# Patient Record
Sex: FEMALE | Race: BLACK OR AFRICAN AMERICAN | NOT HISPANIC OR LATINO | Employment: UNEMPLOYED | URBAN - METROPOLITAN AREA
[De-identification: names, ages, dates, MRNs, and addresses within clinical notes are randomized per-mention and may not be internally consistent; named-entity substitution may affect disease eponyms.]

---

## 2019-11-24 ENCOUNTER — APPOINTMENT (OUTPATIENT)
Dept: CT IMAGING | Facility: CLINIC | Age: 59
End: 2019-11-24
Attending: INTERNAL MEDICINE
Payer: COMMERCIAL

## 2019-11-24 ENCOUNTER — APPOINTMENT (OUTPATIENT)
Dept: GENERAL RADIOLOGY | Facility: CLINIC | Age: 59
End: 2019-11-24
Attending: INTERNAL MEDICINE
Payer: COMMERCIAL

## 2019-11-24 ENCOUNTER — HOSPITAL ENCOUNTER (EMERGENCY)
Facility: CLINIC | Age: 59
Discharge: HOME | End: 2019-11-24
Attending: INTERNAL MEDICINE | Admitting: INTERNAL MEDICINE
Payer: COMMERCIAL

## 2019-11-24 VITALS
DIASTOLIC BLOOD PRESSURE: 71 MMHG | RESPIRATION RATE: 19 BRPM | SYSTOLIC BLOOD PRESSURE: 126 MMHG | TEMPERATURE: 99 F | OXYGEN SATURATION: 99 %

## 2019-11-24 DIAGNOSIS — N10 ACUTE PYELONEPHRITIS: ICD-10-CM

## 2019-11-24 LAB
ABO + RH BLD: NORMAL
ABO + RH BLD: NORMAL
ALBUMIN SERPL-MCNC: 3.3 G/DL (ref 3.4–5)
ALBUMIN UR-MCNC: 30 MG/DL
ALP SERPL-CCNC: 101 U/L (ref 40–150)
ALT SERPL W P-5'-P-CCNC: 34 U/L (ref 0–50)
ANION GAP SERPL CALCULATED.3IONS-SCNC: 8 MMOL/L (ref 3–14)
APPEARANCE UR: ABNORMAL
AST SERPL W P-5'-P-CCNC: 21 U/L (ref 0–45)
BACTERIA #/AREA URNS HPF: ABNORMAL /HPF
BASOPHILS # BLD AUTO: 0 10E9/L (ref 0–0.2)
BASOPHILS NFR BLD AUTO: 0.2 %
BILIRUB SERPL-MCNC: 1 MG/DL (ref 0.2–1.3)
BILIRUB UR QL STRIP: NEGATIVE
BLD GP AB SCN SERPL QL: NORMAL
BLOOD BANK CMNT PATIENT-IMP: NORMAL
BUN SERPL-MCNC: 13 MG/DL (ref 7–30)
CALCIUM SERPL-MCNC: 9.5 MG/DL (ref 8.5–10.1)
CHLORIDE SERPL-SCNC: 104 MMOL/L (ref 94–109)
CO2 SERPL-SCNC: 24 MMOL/L (ref 20–32)
COLOR UR AUTO: YELLOW
CREAT SERPL-MCNC: 0.92 MG/DL (ref 0.52–1.04)
CRP SERPL-MCNC: 188 MG/L (ref 0–8)
DIFFERENTIAL METHOD BLD: NORMAL
EOSINOPHIL # BLD AUTO: 0.1 10E9/L (ref 0–0.7)
EOSINOPHIL NFR BLD AUTO: 0.7 %
ERYTHROCYTE [DISTWIDTH] IN BLOOD BY AUTOMATED COUNT: 12.4 % (ref 10–15)
ERYTHROCYTE [SEDIMENTATION RATE] IN BLOOD BY WESTERGREN METHOD: 61 MM/H (ref 0–30)
GFR SERPL CREATININE-BSD FRML MDRD: 67 ML/MIN/{1.73_M2}
GLUCOSE SERPL-MCNC: 159 MG/DL (ref 70–99)
GLUCOSE UR STRIP-MCNC: NEGATIVE MG/DL
HCT VFR BLD AUTO: 36.6 % (ref 35–47)
HGB BLD-MCNC: 12.2 G/DL (ref 11.7–15.7)
HGB UR QL STRIP: ABNORMAL
IMM GRANULOCYTES # BLD: 0 10E9/L (ref 0–0.4)
IMM GRANULOCYTES NFR BLD: 0.4 %
INR PPP: 1.2 (ref 0.86–1.14)
INTERPRETATION ECG - MUSE: NORMAL
KETONES UR STRIP-MCNC: NEGATIVE MG/DL
LACTATE BLD-SCNC: 1.1 MMOL/L (ref 0.7–2)
LEUKOCYTE ESTERASE UR QL STRIP: ABNORMAL
LYMPHOCYTES # BLD AUTO: 1.5 10E9/L (ref 0.8–5.3)
LYMPHOCYTES NFR BLD AUTO: 15.9 %
MCH RBC QN AUTO: 29.8 PG (ref 26.5–33)
MCHC RBC AUTO-ENTMCNC: 33.3 G/DL (ref 31.5–36.5)
MCV RBC AUTO: 90 FL (ref 78–100)
MONOCYTES # BLD AUTO: 0.7 10E9/L (ref 0–1.3)
MONOCYTES NFR BLD AUTO: 7.5 %
MUCOUS THREADS #/AREA URNS LPF: PRESENT /LPF
NEUTROPHILS # BLD AUTO: 7.2 10E9/L (ref 1.6–8.3)
NEUTROPHILS NFR BLD AUTO: 75.3 %
NITRATE UR QL: NEGATIVE
NRBC # BLD AUTO: 0 10*3/UL
NRBC BLD AUTO-RTO: 0 /100
PH UR STRIP: 6 PH (ref 5–7)
PLATELET # BLD AUTO: 172 10E9/L (ref 150–450)
POTASSIUM SERPL-SCNC: 3.4 MMOL/L (ref 3.4–5.3)
PROT SERPL-MCNC: 8.2 G/DL (ref 6.8–8.8)
RBC # BLD AUTO: 4.09 10E12/L (ref 3.8–5.2)
RBC #/AREA URNS AUTO: 2 /HPF (ref 0–2)
SODIUM SERPL-SCNC: 136 MMOL/L (ref 133–144)
SOURCE: ABNORMAL
SP GR UR STRIP: 1.01 (ref 1–1.03)
SPECIMEN EXP DATE BLD: NORMAL
SQUAMOUS #/AREA URNS AUTO: 1 /HPF (ref 0–1)
TROPONIN I SERPL-MCNC: <0.015 UG/L (ref 0–0.04)
UROBILINOGEN UR STRIP-MCNC: NORMAL MG/DL (ref 0–2)
WBC # BLD AUTO: 9.6 10E9/L (ref 4–11)
WBC #/AREA URNS AUTO: 72 /HPF (ref 0–5)
WBC CLUMPS #/AREA URNS HPF: PRESENT /HPF

## 2019-11-24 PROCEDURE — 25000125 ZZHC RX 250: Performed by: INTERNAL MEDICINE

## 2019-11-24 PROCEDURE — 85025 COMPLETE CBC W/AUTO DIFF WBC: CPT | Performed by: INTERNAL MEDICINE

## 2019-11-24 PROCEDURE — 86900 BLOOD TYPING SEROLOGIC ABO: CPT | Performed by: INTERNAL MEDICINE

## 2019-11-24 PROCEDURE — 25000128 H RX IP 250 OP 636: Performed by: INTERNAL MEDICINE

## 2019-11-24 PROCEDURE — 96361 HYDRATE IV INFUSION ADD-ON: CPT | Performed by: INTERNAL MEDICINE

## 2019-11-24 PROCEDURE — 85610 PROTHROMBIN TIME: CPT | Performed by: INTERNAL MEDICINE

## 2019-11-24 PROCEDURE — 80053 COMPREHEN METABOLIC PANEL: CPT | Performed by: INTERNAL MEDICINE

## 2019-11-24 PROCEDURE — 83605 ASSAY OF LACTIC ACID: CPT | Performed by: INTERNAL MEDICINE

## 2019-11-24 PROCEDURE — 86901 BLOOD TYPING SEROLOGIC RH(D): CPT | Performed by: INTERNAL MEDICINE

## 2019-11-24 PROCEDURE — 93010 ELECTROCARDIOGRAM REPORT: CPT | Mod: Z6 | Performed by: INTERNAL MEDICINE

## 2019-11-24 PROCEDURE — 87186 SC STD MICRODIL/AGAR DIL: CPT | Performed by: INTERNAL MEDICINE

## 2019-11-24 PROCEDURE — 81001 URINALYSIS AUTO W/SCOPE: CPT | Performed by: INTERNAL MEDICINE

## 2019-11-24 PROCEDURE — 87086 URINE CULTURE/COLONY COUNT: CPT | Performed by: INTERNAL MEDICINE

## 2019-11-24 PROCEDURE — 74177 CT ABD & PELVIS W/CONTRAST: CPT

## 2019-11-24 PROCEDURE — 85652 RBC SED RATE AUTOMATED: CPT | Performed by: INTERNAL MEDICINE

## 2019-11-24 PROCEDURE — 86850 RBC ANTIBODY SCREEN: CPT | Performed by: INTERNAL MEDICINE

## 2019-11-24 PROCEDURE — 86140 C-REACTIVE PROTEIN: CPT | Performed by: INTERNAL MEDICINE

## 2019-11-24 PROCEDURE — 96375 TX/PRO/DX INJ NEW DRUG ADDON: CPT | Performed by: INTERNAL MEDICINE

## 2019-11-24 PROCEDURE — 84484 ASSAY OF TROPONIN QUANT: CPT | Performed by: INTERNAL MEDICINE

## 2019-11-24 PROCEDURE — 99285 EMERGENCY DEPT VISIT HI MDM: CPT | Mod: 25 | Performed by: INTERNAL MEDICINE

## 2019-11-24 PROCEDURE — 25800030 ZZH RX IP 258 OP 636: Performed by: INTERNAL MEDICINE

## 2019-11-24 PROCEDURE — 87088 URINE BACTERIA CULTURE: CPT | Performed by: INTERNAL MEDICINE

## 2019-11-24 PROCEDURE — 93005 ELECTROCARDIOGRAM TRACING: CPT | Performed by: INTERNAL MEDICINE

## 2019-11-24 PROCEDURE — 96365 THER/PROPH/DIAG IV INF INIT: CPT | Performed by: INTERNAL MEDICINE

## 2019-11-24 PROCEDURE — 71045 X-RAY EXAM CHEST 1 VIEW: CPT

## 2019-11-24 RX ORDER — IOPAMIDOL 755 MG/ML
100 INJECTION, SOLUTION INTRAVASCULAR ONCE
Status: COMPLETED | OUTPATIENT
Start: 2019-11-24 | End: 2019-11-24

## 2019-11-24 RX ORDER — KETOROLAC TROMETHAMINE 30 MG/ML
30 INJECTION, SOLUTION INTRAMUSCULAR; INTRAVENOUS ONCE
Status: COMPLETED | OUTPATIENT
Start: 2019-11-24 | End: 2019-11-24

## 2019-11-24 RX ORDER — METOCLOPRAMIDE HYDROCHLORIDE 5 MG/ML
5 INJECTION INTRAMUSCULAR; INTRAVENOUS ONCE
Status: COMPLETED | OUTPATIENT
Start: 2019-11-24 | End: 2019-11-24

## 2019-11-24 RX ORDER — CEFTRIAXONE 1 G/1
1 INJECTION, POWDER, FOR SOLUTION INTRAMUSCULAR; INTRAVENOUS ONCE
Status: COMPLETED | OUTPATIENT
Start: 2019-11-24 | End: 2019-11-24

## 2019-11-24 RX ORDER — SODIUM CHLORIDE 9 MG/ML
1000 INJECTION, SOLUTION INTRAVENOUS CONTINUOUS
Status: DISCONTINUED | OUTPATIENT
Start: 2019-11-24 | End: 2019-11-24 | Stop reason: HOSPADM

## 2019-11-24 RX ORDER — LEVOFLOXACIN 500 MG/1
500 TABLET, FILM COATED ORAL DAILY
Qty: 7 TABLET | Refills: 0 | Status: SHIPPED | OUTPATIENT
Start: 2019-11-24 | End: 2019-11-28 | Stop reason: ALTCHOICE

## 2019-11-24 RX ORDER — METOCLOPRAMIDE 5 MG/1
5 TABLET ORAL 4 TIMES DAILY PRN
Qty: 10 TABLET | Refills: 0 | Status: SHIPPED | OUTPATIENT
Start: 2019-11-24

## 2019-11-24 RX ADMIN — SODIUM CHLORIDE 1000 ML: 9 INJECTION, SOLUTION INTRAVENOUS at 08:19

## 2019-11-24 RX ADMIN — SODIUM CHLORIDE 65 ML: 9 INJECTION, SOLUTION INTRAVENOUS at 09:36

## 2019-11-24 RX ADMIN — SODIUM CHLORIDE 1000 ML: 9 INJECTION, SOLUTION INTRAVENOUS at 10:17

## 2019-11-24 RX ADMIN — METOCLOPRAMIDE 5 MG: 5 INJECTION, SOLUTION INTRAMUSCULAR; INTRAVENOUS at 08:20

## 2019-11-24 RX ADMIN — IOPAMIDOL 100 ML: 755 INJECTION, SOLUTION INTRAVENOUS at 09:36

## 2019-11-24 RX ADMIN — CEFTRIAXONE SODIUM 1 G: 1 INJECTION, POWDER, FOR SOLUTION INTRAMUSCULAR; INTRAVENOUS at 10:29

## 2019-11-24 RX ADMIN — KETOROLAC TROMETHAMINE 30 MG: 30 INJECTION, SOLUTION INTRAMUSCULAR; INTRAVENOUS at 08:20

## 2019-11-24 ASSESSMENT — ENCOUNTER SYMPTOMS
MYALGIAS: 1
WHEEZING: 0
NAUSEA: 1
DYSURIA: 1
FATIGUE: 1
COUGH: 0
SHORTNESS OF BREATH: 0
ABDOMINAL PAIN: 1
LIGHT-HEADEDNESS: 1
VOMITING: 1
DIARRHEA: 0
FEVER: 1

## 2019-11-24 NOTE — DISCHARGE INSTRUCTIONS
"  Kidney Infection (Adult Female)    An infection in one or both kidneys is called \"pyelonephritis.\" It usually happens when bacteria (or rarely, viruses, fungi, or other disease-causing organisms) get into the kidney. The bacteria (or other disease-causing organisms) can enter the kidneys from the bladder or blood traveling from other parts of the body. A kidney infection can become serious. It can cause severe illness, scarring of the kidneys, or kidney failure if not treated properly.  Common causes for this problem include:    Not keeping the genital area clean and dry, which promotes the growth of bacteria    Wiping back to front which drags bacteria from the rectum toward the urinary opening (urethra)    Wearing tight pants or underwear (this lets moisture build up in the genital area, which helps bacteria grow)    Holding urine in for long periods of time    Dehydration  Kidney infections can cause symptoms similar to a bladder infection. Symptoms include:    Pain (or burning) when urinating    Having to urinate more often than usual    Blood in the urine (pink or red)    Abdominal pain or discomfort, usually in the lower abdomen    Pain in the side or back    Pain above the pubic bone    Fever or chills    Vomiting    Loss of appetite  Treatment is oral antibiotics, or in more severe cases, intramuscular or IV antibiotics. These are started right away and may be changed once urine culture results determine the infecting organisms. Treatment helps prevent a more serious kidney infection.  Medicines  Medicines can help in the treatment of a bladder infection:    Take antibiotics until they are used up, even if you feel better. It is important to finish them to make sure the infection is gone.    Unless another medicine was prescribed, you can use over-the-counter medicines for pain, fever, or discomfort. If you have chronic liver of kidney disease, talk with your healthcare provider before using these " medicines. Also talk with your provider if you've ever had a stomach ulcer or gastrointestinal (GI) bleeding, or are taking blood thinners.  Home care  The following are general care guidelines:    Stay home from work or school. Rest in bed until your fever breaks and you are feeling better, or as advised by your healthcare provider.    Drink lots of fluid unless you must restrict fluids for other medical reasons. This will force the medicine into your urinary system and flush the bacteria out of your body. Ask your healthcare provider how much you should drink.    Don't have sex until you have finished all of your medicine and your symptoms are gone.    Don't have caffeine, alcohol, or spicy foods. These foods may irritate the kidneys and bladder.    Don't take bubble baths. Sensitivity to the chemicals in bubble baths can irritate the urethra.    Make sure you wipe from front to back after using the toilet.    Wear loose cloths and cotton underwear.  Prevention  These self-care steps can help prevent future infections:    Drink plenty of fluids to prevent dehydration and flush out the bladder. Do this unless you must restrict fluids for other health reasons, or your healthcare provider told you not to.    Proper cleaning after going to the bathroom in important. Make sure you wipe from front to back after using the toilet.    Urinate more often. Don't try to hold urine in for a long time.    Don't wear tight-fitting pants and underwear.    Improve your diet to prevent constipation. Eat more fruits, vegetables, and fiber. Eat less junk and fatty foods. Constipation can make a urinary tract infection more likely. Talk with your healthcare provider if you have trouble with bowel movements.    Urinate right after intercourse to flush out the bladder.  Follow-up care  Follow up with your healthcare provider, or as advised. Additional testing may be needed to make sure the infection has cleared. Close follow-up and  further testing is very important to find the cause and to prevent future infections.  If a urine culture was done, you will be contacted if your treatment needs to be changed. If directed, you may call to find out the results.  If you had an X-ray, CT scan, or other diagnostic test, you will be notified of any new findings that may affect your care.  Call 911  Call 911 if any of the following occur:    Trouble breathing    Fainting or loss of consciousness    Rapid or very slow heart rate    Weakness, dizziness, or fainting    Difficulty arousing or confusion  When to seek medical advice  Call your healthcare provider right away if any of these occur:    Fever 100.4 F (38 C) or higher, or as directed by your healthcare provider    Not feeling better within 1 to 2 days after starting antibiotics    Any symptom that continues after 3 days of treatment    Increasing pain in the stomach, back, side, or groin area    Repeated vomiting    Not able to take prescribed medicine due to nausea or another reason    Bloody, dark-colored, or foul smelling urine    Trouble urinating or decreased urine output    No urine for 8 hours, no tears when crying, sunken eyes, or dry mouth  Date Last Reviewed: 10/1/2016    8005-2446 [employer]. [address] [place] [address]. All rights reserved. This information is not intended as a substitute for professional medical care. Always follow your healthcare professional's instructions.      Please make an appointment to follow up with [facility] (phone: [phone] or [facility] (phone: [phone]) or [facility] (phone: [phone]) in 3-7 days even if entirely better.

## 2019-11-24 NOTE — ED AVS SNAPSHOT
[facility], Emergency Department  [address]  [facility] [[email or web address] [address]  Phone:  [phone]  Fax:  [phone]                                    Amber Valencia   MRN: [id number]    Department:  [facility], Emergency Department   Date of Visit:  11/24/2019           After Visit Summary Signature Page    I have received my discharge instructions, and my questions have been answered. I have discussed any challenges I see with this plan with the nurse or doctor.    ..........................................................................................................................................  Patient/Patient Representative Signature      ..........................................................................................................................................  Patient Representative Print Name and Relationship to Patient    ..................................................               ................................................  Date                                   Time    ..........................................................................................................................................  Reviewed by Signature/Title    ...................................................              ..............................................  Date                                               Time          22EPIC Rev 08/18

## 2019-11-24 NOTE — ED PROVIDER NOTES
[facility] EMERGENCY DEPARTMENT ([facility])    11/24/19       History     Chief Complaint   Patient presents with     Dizziness     started friday: flu like symptoms: side/back/neck/shoulder pain; nausea     HPI  Amber KANU Valencia is a 59 year old female with a history of a borderline hypertension, not on medications, who presents with fatigue, nausea, vomiting and fevers since Thursday.  No diarrhea, but noted to have right-sided abdominal/flank pain as well.  She also reports diffuse body aches as well.  No URI symptoms and she denies any chest pain or shortness of breath.  She was apparently praying this morning and almost fainted apparently, family decided to bring her into the Emergency Department.  She also complains of dysuria.    This part of the medical record was transcribed by Dominick Christine, Medical Scribe, from a dictation done by Jose Guadalupe Connolly MD.       I have reviewed the Medications, Allergies, Past Medical and Surgical History, and Social History in the Epic system.    No past medical history on file.    No past surgical history on file.    No family history on file.    Social History     Tobacco Use     Smoking status: Not on file   Substance Use Topics     Alcohol use: Not on file       No current facility-administered medications for this encounter.      Current Outpatient Medications   Medication     levofloxacin (LEVAQUIN) 500 MG tablet     metoclopramide (REGLAN) 5 MG tablet      No Known Allergies      Review of Systems   Constitutional: Positive for fatigue and fever.   HENT: Negative for congestion.    Respiratory: Negative for cough, shortness of breath and wheezing.    Cardiovascular: Negative for chest pain.   Gastrointestinal: Positive for abdominal pain (right sided), nausea and vomiting. Negative for diarrhea.   Genitourinary: Positive for dysuria.   Musculoskeletal: Positive for myalgias.   Neurological: Positive for light-headedness.   All other systems reviewed and are  negative.      Physical Exam   BP: (!) 148/71  Heart Rate: 87  Temp: 99  F (37.2  C)  SpO2: 97 %      Physical Exam  Constitutional:       General: She is not in acute distress.     Appearance: She is not diaphoretic.   HENT:      Head: Atraumatic.      Mouth/Throat:      Pharynx: No oropharyngeal exudate.   Eyes:      General: No scleral icterus.     Pupils: Pupils are equal, round, and reactive to light.   Neck:      Musculoskeletal: Neck supple.   Cardiovascular:      Rate and Rhythm: Normal rate and regular rhythm.      Heart sounds: Normal heart sounds. No murmur. No friction rub. No gallop.    Pulmonary:      Effort: Pulmonary effort is normal. No respiratory distress.      Breath sounds: No stridor. No wheezing, rhonchi or rales.   Chest:      Chest wall: No tenderness.   Abdominal:      General: Abdomen is flat. Bowel sounds are normal. There is no distension.      Palpations: Abdomen is soft.      Tenderness: There is no abdominal tenderness. There is right CVA tenderness. There is no left CVA tenderness, guarding or rebound. Negative signs include Arguello's sign, Rovsing's sign, McBurney's sign, psoas sign and obturator sign.      Hernia: No hernia is present.   Musculoskeletal:         General: No tenderness.   Skin:     General: Skin is warm.      Findings: No rash.         ED Course        Procedures  Results for orders placed during the hospital encounter of 11/24/19   POC US RETROPERITONEAL LIMITED    Impression Limited Bedside Renal Ultrasound, performed and interpreted by me.    Indication: Flank pain  Images of the the right and left kidney were acquired in short and long axis, evaluating for hydronephrosis. A short and long axis of the bladder was evaluated for bladder stones. Image quality was satisfactory..     Findings:  No evidence of hydronephrosis in bilateral kidneys.    No urinary bladder stones seen.         IMPRESSION: No evidence of hydronephrosis or stones.               EKG  Interpretation:      Interpreted by Jose Guadalupe Connolly MD  Time reviewed: 8:12  Symptoms at time of EKG: Dizziness   Rhythm: normal sinus   Rate: 89 bpm  Axis: normal  Ectopy: none  Conduction: normal  ST Segments/ T Waves: No ST-T wave changes  Q Waves: none  Comparison to prior: No old EKG available    Clinical Impression: normal EKG      Results for orders placed or performed during the hospital encounter of 11/24/19 (from the past 24 hour(s))   CBC with platelets differential     Status: None    Collection Time: 11/24/19  8:08 AM   Result Value Ref Range    WBC 9.6 4.0 - 11.0 10e9/L    RBC Count 4.09 3.8 - 5.2 10e12/L    Hemoglobin 12.2 11.7 - 15.7 g/dL    Hematocrit 36.6 35.0 - 47.0 %    MCV 90 78 - 100 fl    MCH 29.8 26.5 - 33.0 pg    MCHC 33.3 31.5 - 36.5 g/dL    RDW 12.4 10.0 - 15.0 %    Platelet Count 172 150 - 450 10e9/L    Diff Method Automated Method     % Neutrophils 75.3 %    % Lymphocytes 15.9 %    % Monocytes 7.5 %    % Eosinophils 0.7 %    % Basophils 0.2 %    % Immature Granulocytes 0.4 %    Nucleated RBCs 0 0 /100    Absolute Neutrophil 7.2 1.6 - 8.3 10e9/L    Absolute Lymphocytes 1.5 0.8 - 5.3 10e9/L    Absolute Monocytes 0.7 0.0 - 1.3 10e9/L    Absolute Eosinophils 0.1 0.0 - 0.7 10e9/L    Absolute Basophils 0.0 0.0 - 0.2 10e9/L    Abs Immature Granulocytes 0.0 0 - 0.4 10e9/L    Absolute Nucleated RBC 0.0    ABO/Rh type and screen     Status: None    Collection Time: 11/24/19  8:08 AM   Result Value Ref Range    ABO O     RH(D) Pos     Antibody Screen Neg     Test Valid Only At          [facility]    Specimen Expires 11/27/2019    INR     Status: Abnormal    Collection Time: 11/24/19  8:08 AM   Result Value Ref Range    INR 1.20 (H) 0.86 - 1.14   Comprehensive metabolic panel     Status: Abnormal    Collection Time: 11/24/19  8:08 AM   Result Value Ref Range    Sodium 136 133 - 144 mmol/L    Potassium 3.4 3.4 - 5.3 mmol/L    Chloride 104 94 - 109 mmol/L     Carbon Dioxide 24 20 - 32 mmol/L    Anion Gap 8 3 - 14 mmol/L    Glucose 159 (H) 70 - 99 mg/dL    Urea Nitrogen 13 7 - 30 mg/dL    Creatinine 0.92 0.52 - 1.04 mg/dL    GFR Estimate 67 >60 mL/min/[1.73_m2]    GFR Estimate If Black 78 >60 mL/min/[1.73_m2]    Calcium 9.5 8.5 - 10.1 mg/dL    Bilirubin Total 1.0 0.2 - 1.3 mg/dL    Albumin 3.3 (L) 3.4 - 5.0 g/dL    Protein Total 8.2 6.8 - 8.8 g/dL    Alkaline Phosphatase 101 40 - 150 U/L    ALT 34 0 - 50 U/L    AST 21 0 - 45 U/L   Troponin I     Status: None    Collection Time: 11/24/19  8:08 AM   Result Value Ref Range    Troponin I ES <0.015 0.000 - 0.045 ug/L   Lactic acid whole blood     Status: None    Collection Time: 11/24/19  8:08 AM   Result Value Ref Range    Lactic Acid 1.1 0.7 - 2.0 mmol/L   CRP inflammation     Status: Abnormal    Collection Time: 11/24/19  8:08 AM   Result Value Ref Range    CRP Inflammation 188.0 (H) 0.0 - 8.0 mg/L   Erythrocyte sedimentation rate auto     Status: Abnormal    Collection Time: 11/24/19  8:08 AM   Result Value Ref Range    Sed Rate 61 (H) 0 - 30 mm/h   EKG 12-lead, tracing only     Status: None (Preliminary result)    Collection Time: 11/24/19  8:11 AM   Result Value Ref Range    Interpretation ECG Click View Image link to view waveform and result    XR Chest Port 1 View     Status: None    Collection Time: 11/24/19  8:23 AM    Narrative    XR CHEST PORT 1 VW 11/24/2019 8:23 AM    CLINICAL HISTORY: right side pain    COMPARISON: None    FINDINGS:    The lungs and pleural spaces are clear. The cardiac  silhouette and pulmonary vascularity are normal. No rib fracture.      Impression    IMPRESSION:    Normal chest.     TREE HOLCOMB MD   POC US RETROPERITONEAL LIMITED     Status: None    Collection Time: 11/24/19  8:35 AM    Impression    Limited Bedside Renal Ultrasound, performed and interpreted by me.    Indication: Flank pain  Images of the the right and left kidney were acquired in short and long axis, evaluating for  hydronephrosis. A short and long axis of the bladder was evaluated for bladder stones. Image quality was satisfactory..     Findings:  No evidence of hydronephrosis in bilateral kidneys.    No urinary bladder stones seen.         IMPRESSION: No evidence of hydronephrosis or stones.     UA reflex to Microscopic and Culture     Status: Abnormal    Collection Time: 11/24/19  8:46 AM   Result Value Ref Range    Color Urine Yellow     Appearance Urine Slightly Cloudy     Glucose Urine Negative NEG^Negative mg/dL    Bilirubin Urine Negative NEG^Negative    Ketones Urine Negative NEG^Negative mg/dL    Specific Gravity Urine 1.007 1.003 - 1.035    Blood Urine Small (A) NEG^Negative    pH Urine 6.0 5.0 - 7.0 pH    Protein Albumin Urine 30 (A) NEG^Negative mg/dL    Urobilinogen mg/dL Normal 0.0 - 2.0 mg/dL    Nitrite Urine Negative NEG^Negative    Leukocyte Esterase Urine Large (A) NEG^Negative    Source Midstream Urine     RBC Urine 2 0 - 2 /HPF    WBC Urine 72 (H) 0 - 5 /HPF    WBC Clumps Present (A) NEG^Negative /HPF    Bacteria Urine Few (A) NEG^Negative /HPF    Squamous Epithelial /HPF Urine 1 0 - 1 /HPF    Mucous Urine Present (A) NEG^Negative /LPF   Urine Culture Aerobic Bacterial     Status: None (Preliminary result)    Collection Time: 11/24/19  8:46 AM   Result Value Ref Range    Specimen Description Midstream Urine     Special Requests Specimen received in preservative     Culture Micro PENDING    CT Abdomen Pelvis w Contrast     Status: None    Collection Time: 11/24/19  9:33 AM    Narrative    CT ABDOMEN AND PELVIS WITH CONTRAST   11/24/2019 9:33 AM     HISTORY: Right-sided abdominal pain.    TECHNIQUE: 100 mL Isovue 370 IV were administered. After contrast  administration, volumetric helical sections were acquired from the  lung bases to the ischial tuberosities. Coronal images were also  reconstructed. Radiation dose for this scan was reduced using  automated exposure control, adjustment of the mA and/or kV  according  to patient size, or iterative reconstruction technique.    COMPARISON: None.     FINDINGS: No bowel obstruction. Unremarkable appendix. No convincing  evidence for colitis or diverticulitis. No free fluid in the pelvis.  Few small calcified granulomas in the liver and spleen. The liver,  gallbladder, spleen, adrenal glands, and pancreas are unremarkable.  There is a nonobstructing 0.2 cm stone in the interpolar region of the  right kidney. Subtle heterogeneous enhancement of the right kidney  could be within normal limits, although a mild pyelonephritis could  have this appearance. The kidneys are otherwise unremarkable. No  hydronephrosis. The visualized lung bases are clear.      Impression    IMPRESSION:   1. Subtle heterogeneous enhancement of the right kidney raises the  possibility of right pyelonephritis. Please clinically correlate.  2. Nonobstructing 0.2 cm stone in the interpolar region of the right  kidney.   3. No evidence for appendicitis.      NIKA BINGHAM MD           Labs Ordered and Resulted from Time of ED Arrival Up to the Time of Departure from the ED   INR - Abnormal; Notable for the following components:       Result Value    INR 1.20 (*)     All other components within normal limits   COMPREHENSIVE METABOLIC PANEL - Abnormal; Notable for the following components:    Glucose 159 (*)     Albumin 3.3 (*)     All other components within normal limits   CRP INFLAMMATION - Abnormal; Notable for the following components:    CRP Inflammation 188.0 (*)     All other components within normal limits   ERYTHROCYTE SEDIMENTATION RATE AUTO - Abnormal; Notable for the following components:    Sed Rate 61 (*)     All other components within normal limits   UA MACROSCOPIC WITH REFLEX TO MICRO AND CULTURE - Abnormal; Notable for the following components:    Blood Urine Small (*)     Protein Albumin Urine 30 (*)     Leukocyte Esterase Urine Large (*)     WBC Urine 72 (*)     WBC Clumps Present (*)      Bacteria Urine Few (*)     Mucous Urine Present (*)     All other components within normal limits   CBC WITH PLATELETS DIFFERENTIAL   TROPONIN I   LACTIC ACID WHOLE BLOOD   CARDIAC CONTINUOUS MONITORING   ABO/RH TYPE AND SCREEN   URINE CULTURE AEROBIC BACTERIAL            Assessments & Plan (with Medical Decision Making)  Acute pyelonephritis right, no hydron on POCUS, CT no appy or other acute pathologies, improved after toradol reglan crystalloid 2 litters, tolerating po, given rocephin 1 gram IV, wish to be discharged with po abx, will discharge with levaquin and reglan prn, follow up with her PMD within one week.       I have reviewed the nursing notes.    I have reviewed the findings, diagnosis, plan and need for follow up with the patient.    Discharge Medication List as of 11/24/2019 11:21 AM      START taking these medications    Details   levofloxacin (LEVAQUIN) 500 MG tablet Take 1 tablet (500 mg) by mouth daily for 7 days, Disp-7 tablet, R-0, Local Print      metoclopramide (REGLAN) 5 MG tablet Take 1 tablet (5 mg) by mouth 4 times daily as needed (nausea), Disp-10 tablet, R-0, Local Print             Final diagnoses:   Acute pyelonephritis       11/24/2019   [facility] [place], EMERGENCY DEPARTMENT     Jose Guadalupe Connolly MD  11/24/19 1043

## 2019-11-25 NOTE — RESULT ENCOUNTER NOTE
Emergency Dept/Urgent Care discharge antibiotic (if prescribed): Levofloxacin (Levaquin) 500 mg PO tablet, 1 tablet (500 mg) by mouth once daily for 7 days.  Date of Rx (if applicable):  11/24/19  No changes in treatment per Urine culture protocol.

## 2019-11-26 ENCOUNTER — TELEPHONE (OUTPATIENT)
Dept: EMERGENCY MEDICINE | Facility: CLINIC | Age: 59
End: 2019-11-26

## 2019-11-26 DIAGNOSIS — N39.0 URINARY TRACT INFECTION: ICD-10-CM

## 2019-11-26 LAB
BACTERIA SPEC CULT: ABNORMAL
Lab: ABNORMAL
SPECIMEN SOURCE: ABNORMAL

## 2019-11-26 NOTE — TELEPHONE ENCOUNTER
[facility] Emergency Department Lab result notification [Adult-Female]    [facility] ED lab result protocol used  Urine culture    Reason for call  Notify of lab results, assess symptoms,  review ED providers recommendations/discharge instructions (if necessary) and advise per ED lab result f/u protocol    Lab Result (including Rx patient on, if applicable)  Final Urine Culture Report on 11/26/19  Emergency Dept/Urgent Care discharge antibiotic prescribed: Levofloxacin (Levaquin) 500 mg PO tablet, 1 tablet (500 mg) by mouth once daily for 7 days.  #1. Bacteria, 50,000 to 100,000 colonies/ml Escherichia coli,  is [INTERMEDIATE SUSCEPTIBLE] to antibiotic.   Change in treatment as per [facility] ED Lab result protocol.  Information table from ED Provider visit on 11/24/19  Symptoms reported at ED visit (Chief complaint, HPI) Dizziness        started friday: flu like symptoms: side/back/neck/shoulder pain; nausea      HPI  Amber B Erik is a 59 year old female with a history of a borderline hypertension, not on medications, who presents with fatigue, nausea, vomiting and fevers since Thursday.  No diarrhea, but noted to have right-sided abdominal/flank pain as well.  She also reports diffuse body aches as well.  No URI symptoms and she denies any chest pain or shortness of breath.  She was apparently praying this morning and almost fainted apparently, family decided to bring her into the Emergency Department.  She also complains of dysuria.   Significant Medical hx, if applicable (i.e. CKD, diabetes) None   Allergies No Known Allergies   Weight, if applicable Wt Readings from Last 2 Encounters:   No data found for Wt      Coumadin/Warfarin [Yes /No] No   Creatinine Level (mg/dl) Creatinine   Date Value Ref Range Status   11/24/2019 0.92 0.52 - 1.04 mg/dL Final      Creatinine clearance (ml/min), if applicable Creatinine clearance cannot be calculated (Unknown ideal weight.)   Pregnant (Yes/No/NA) No   Breastfeeding  (Yes/No/NA) No   ED providers Impression and Plan (applicable information) Acute pyelonephritis right, no hydron on POCUS, CT no appy or other acute pathologies, improved after toradol reglan crystalloid 2 litters, tolerating po, given rocephin 1 gram IV, wish to be discharged with po abx, will discharge with levaquin and reglan prn, follow up with her PMD within one week.   ED diagnosis Acute pyelonephritis   ED provider Jose Guadalupe Connolly MD      RN Assessment (Patient s current Symptoms), include time called.  [Insert Left message here if message left]  11:37AM: Left voicemail message With assistance of [employer]  # [phone] requesting a call back to [facility] ED Lab Result RN at [phone].  RN is available every day between 10 a.m. and 6:30 p.m..      [RN Name]  Hellen Doss RN  [facility] RN  Lung Nodule and ED Lab Result RN  Epic pool (ED late result f/u RN): [id number]  [facility] INCIDENTAL RADIOLOGY F/U NURSES: P [id number]  Ph# [phone]    Copy of Lab result   Urine Culture Aerobic Bacterial [[id number]] (Order [id number])   Order Requisition     Urine Culture Aerobic Bacterial (Order #[id number]) on 11/24/19   Exam Information     Exam Date Exam Time Accession # Results    11/24/19  8:46 AM [id number]    Component Results     Specimen Information: Midstream Urine        Component Collected Lab   Specimen Description 11/24/2019  8:46    Midstream Urine    Special Requests 11/24/2019  8:46    Specimen received in preservative    Culture Micro Abnormal  11/24/2019  8:46    50,000 to 100,000 colonies/mL   Escherichia coli    Susceptibility     Escherichia coli     Antibiotic Interpretation Sensitivity Method Status   AMPICILLIN Resistant >=32 ug/mL FREDERICK Final   AMPICILLIN/SULBACTAM Resistant >=32 ug/mL FREDERICK Final   CEFAZOLIN Sensitive <=4 ug/mL FREDERICK Final    Cefazolin FREDERICK breakpoints are for the treatment of uncomplicated urinary tract   infections.  For the treatment of  systemic infections, please contact the   laboratory for additional testing.   CEFEPIME Sensitive <=1 ug/mL FREDERICK Final   CEFOXITIN Sensitive <=4 ug/mL FREDERICK Final   CEFTAZIDIME Sensitive <=1 ug/mL FREDERICK Final   CEFTRIAXONE Sensitive <=1 ug/mL FREDERICK Final   CIPROFLOXACIN Resistant 1 ug/mL FREDERICK Final   GENTAMICIN Sensitive <=1 ug/mL FREDERICK Final   LEVOFLOXACIN Intermediate 1 ug/mL FREDERICK Final   NITROFURANTOIN Sensitive <=16 ug/mL FREDERICK Final   Piperacillin/Tazo Sensitive <=4 ug/mL FREDERICK Final   TOBRAMYCIN Sensitive <=1 ug/mL FREDERICK Final   Trimethoprim/Sulfa Sensitive <=1/19 ug/mL FREDERICK Final

## 2019-11-28 RX ORDER — SULFAMETHOXAZOLE/TRIMETHOPRIM 800-160 MG
1 TABLET ORAL 2 TIMES DAILY
Qty: 28 TABLET | Refills: 0 | Status: SHIPPED | OUTPATIENT
Start: 2019-11-28 | End: 2019-12-12

## 2019-11-28 NOTE — TELEPHONE ENCOUNTER
"[facility] Emergency Department Lab result notification:    Reason for call  Final UC result notification    Lab Result  Final Urine Culture Report on 11/26/19  Emergency Dept/Urgent Care discharge antibiotic prescribed: Levofloxacin (Levaquin) 500 mg PO tablet, 1 tablet (500 mg) by mouth once daily for 7 days.  #1. Bacteria, 50,000 to 100,000 colonies/ml Escherichia coli,  is [INTERMEDIATE SUSCEPTIBLE] to antibiotic.   Change in treatment as per [place] ED Lab result protocol.    ED visit Date: 11/24/19  Symptoms reported at ED visit Dizziness         started friday: flu like symptoms: side/back/neck/shoulder pain; nausea      HPI  Amber B Erik is a 59 year old female with a history of a borderline hypertension, not on medications, who presents with fatigue, nausea, vomiting and fevers since Thursday.  No diarrhea, but noted to have right-sided abdominal/flank pain as well.  She also reports diffuse body aches as well.  No URI symptoms and she denies any chest pain or shortness of breath.  She was apparently praying this morning and almost fainted apparently, family decided to bring her into the Emergency Department.  She also complains of dysuria.     Miscellaneous information Allergies, cr cl, meds, history noted below.     Current symptoms  Spoke with son Deepak (no consent to communicate on file).  Son did give another contact number for patient,msg left.  Today (11/28/19) calling back, permission given to speak to niece.  Results and recommendations relayed, verbalizes understanding.  Feeling \"okay\" with some residual dizziness.       Recommendations/Instructions  To start Bactrim (800-160 mg) PO BID x 14 days.  Ordered to requested pharmacy, advised to stop Levaquin once Bactrim obtained.      Contact your PCP clinic or return to the Emergency department if your:    Symptoms return.    Symptoms do not resolve after completing antibiotic.    Symptoms worsen or other concerning symptom's.    Arcelia Ibrahim, " RN    [facility]   Lung Nodule and ED Lab Results F/U RN  Epic pool (ED late result f/u RN) : P [id number]   # [phone]    Copy of Lab result   Order   Urine Culture Aerobic Bacterial [[id number]] (Order [id number])   Order Requisition     Urine Culture Aerobic Bacterial (Order #[id number]) on 11/24/19   Exam Information     Exam Date Exam Time Accession # Results    11/24/19  8:46 AM [id number]    Component Results     Specimen Information: Midstream Urine        Component Collected Lab   Specimen Description 11/24/2019  8:46    Midstream Urine    Special Requests 11/24/2019  8:46    Specimen received in preservative    Culture Micro Abnormal  11/24/2019  8:46    50,000 to 100,000 colonies/mL   Escherichia coli    Susceptibility     Escherichia coli     Antibiotic Interpretation Sensitivity Method Status   AMPICILLIN Resistant >=32 ug/mL FREDERICK Final   AMPICILLIN/SULBACTAM Resistant >=32 ug/mL FREDERICK Final   CEFAZOLIN Sensitive <=4 ug/mL FREDERICK Final    Cefazolin FREDERICK breakpoints are for the treatment of uncomplicated urinary tract   infections.  For the treatment of systemic infections, please contact the   laboratory for additional testing.   CEFEPIME Sensitive <=1 ug/mL FREDERICK Final   CEFOXITIN Sensitive <=4 ug/mL FREDERICK Final   CEFTAZIDIME Sensitive <=1 ug/mL FREDERICK Final   CEFTRIAXONE Sensitive <=1 ug/mL FREDERICK Final   CIPROFLOXACIN Resistant 1 ug/mL FREDERICK Final   GENTAMICIN Sensitive <=1 ug/mL FREDERICK Final   LEVOFLOXACIN Intermediate 1 ug/mL FREDERICK Final   NITROFURANTOIN Sensitive <=16 ug/mL FREDERICK Final   Piperacillin/Tazo Sensitive <=4 ug/mL FREDERICK Final   TOBRAMYCIN Sensitive <=1 ug/mL FREDERICK Final   Trimethoprim/Sulfa Sensitive <=1/19 ug/mL FREDERICK Final

## 2021-05-04 ENCOUNTER — IMMUNIZATION (OUTPATIENT)
Dept: NURSING | Facility: CLINIC | Age: 61
End: 2021-05-04
Payer: COMMERCIAL

## 2021-05-04 PROCEDURE — 0001A PR COVID VAC PFIZER DIL RECON 30 MCG/0.3 ML IM: CPT

## 2021-05-04 PROCEDURE — 91300 PR COVID VAC PFIZER DIL RECON 30 MCG/0.3 ML IM: CPT

## 2021-05-25 ENCOUNTER — IMMUNIZATION (OUTPATIENT)
Dept: NURSING | Facility: CLINIC | Age: 61
End: 2021-05-25
Attending: INTERNAL MEDICINE
Payer: COMMERCIAL

## 2021-05-25 PROCEDURE — 0002A PR COVID VAC PFIZER DIL RECON 30 MCG/0.3 ML IM: CPT

## 2021-05-25 PROCEDURE — 91300 PR COVID VAC PFIZER DIL RECON 30 MCG/0.3 ML IM: CPT

## 2023-03-25 ENCOUNTER — HOSPITAL ENCOUNTER (EMERGENCY)
Facility: CLINIC | Age: 63
Discharge: HOME | End: 2023-03-25
Attending: FAMILY MEDICINE | Admitting: FAMILY MEDICINE
Payer: COMMERCIAL

## 2023-03-25 VITALS
TEMPERATURE: 98.4 F | HEART RATE: 78 BPM | RESPIRATION RATE: 14 BRPM | OXYGEN SATURATION: 97 % | DIASTOLIC BLOOD PRESSURE: 90 MMHG | WEIGHT: 232 LBS | SYSTOLIC BLOOD PRESSURE: 160 MMHG

## 2023-03-25 DIAGNOSIS — I10 ESSENTIAL HYPERTENSION: ICD-10-CM

## 2023-03-25 DIAGNOSIS — M79.622 PAIN OF LEFT UPPER ARM: ICD-10-CM

## 2023-03-25 LAB
ALBUMIN SERPL BCG-MCNC: 4.3 G/DL (ref 3.5–5.2)
ALP SERPL-CCNC: 90 U/L (ref 35–104)
ALT SERPL W P-5'-P-CCNC: 53 U/L (ref 10–35)
ANION GAP SERPL CALCULATED.3IONS-SCNC: 11 MMOL/L (ref 7–15)
AST SERPL W P-5'-P-CCNC: 37 U/L (ref 10–35)
BASOPHILS # BLD AUTO: 0 10E3/UL (ref 0–0.2)
BASOPHILS NFR BLD AUTO: 1 %
BILIRUB SERPL-MCNC: 0.3 MG/DL
BUN SERPL-MCNC: 14.8 MG/DL (ref 8–23)
CALCIUM SERPL-MCNC: 10.2 MG/DL (ref 8.8–10.2)
CHLORIDE SERPL-SCNC: 103 MMOL/L (ref 98–107)
CREAT SERPL-MCNC: 0.66 MG/DL (ref 0.51–0.95)
DEPRECATED HCO3 PLAS-SCNC: 23 MMOL/L (ref 22–29)
EOSINOPHIL # BLD AUTO: 0.2 10E3/UL (ref 0–0.7)
EOSINOPHIL NFR BLD AUTO: 3 %
ERYTHROCYTE [DISTWIDTH] IN BLOOD BY AUTOMATED COUNT: 12.5 % (ref 10–15)
GFR SERPL CREATININE-BSD FRML MDRD: >90 ML/MIN/1.73M2
GLUCOSE SERPL-MCNC: 139 MG/DL (ref 70–99)
HCT VFR BLD AUTO: 42.2 % (ref 35–47)
HGB BLD-MCNC: 14.5 G/DL (ref 11.7–15.7)
HOLD SPECIMEN: NORMAL
IMM GRANULOCYTES # BLD: 0 10E3/UL
IMM GRANULOCYTES NFR BLD: 0 %
LYMPHOCYTES # BLD AUTO: 2.5 10E3/UL (ref 0.8–5.3)
LYMPHOCYTES NFR BLD AUTO: 46 %
MCH RBC QN AUTO: 29.7 PG (ref 26.5–33)
MCHC RBC AUTO-ENTMCNC: 34.4 G/DL (ref 31.5–36.5)
MCV RBC AUTO: 87 FL (ref 78–100)
MONOCYTES # BLD AUTO: 0.4 10E3/UL (ref 0–1.3)
MONOCYTES NFR BLD AUTO: 8 %
NEUTROPHILS # BLD AUTO: 2.3 10E3/UL (ref 1.6–8.3)
NEUTROPHILS NFR BLD AUTO: 42 %
NRBC # BLD AUTO: 0 10E3/UL
NRBC BLD AUTO-RTO: 0 /100
PLATELET # BLD AUTO: 223 10E3/UL (ref 150–450)
POTASSIUM SERPL-SCNC: 4.1 MMOL/L (ref 3.4–5.3)
PROT SERPL-MCNC: 8.2 G/DL (ref 6.4–8.3)
RBC # BLD AUTO: 4.88 10E6/UL (ref 3.8–5.2)
SODIUM SERPL-SCNC: 137 MMOL/L (ref 136–145)
TROPONIN T SERPL HS-MCNC: 8 NG/L
WBC # BLD AUTO: 5.5 10E3/UL (ref 4–11)

## 2023-03-25 PROCEDURE — 93010 ELECTROCARDIOGRAM REPORT: CPT | Performed by: FAMILY MEDICINE

## 2023-03-25 PROCEDURE — 36415 COLL VENOUS BLD VENIPUNCTURE: CPT | Performed by: NURSE PRACTITIONER

## 2023-03-25 PROCEDURE — 80053 COMPREHEN METABOLIC PANEL: CPT | Performed by: NURSE PRACTITIONER

## 2023-03-25 PROCEDURE — 93005 ELECTROCARDIOGRAM TRACING: CPT

## 2023-03-25 PROCEDURE — 85025 COMPLETE CBC W/AUTO DIFF WBC: CPT | Performed by: NURSE PRACTITIONER

## 2023-03-25 PROCEDURE — 99284 EMERGENCY DEPT VISIT MOD MDM: CPT

## 2023-03-25 PROCEDURE — 84484 ASSAY OF TROPONIN QUANT: CPT | Performed by: NURSE PRACTITIONER

## 2023-03-25 PROCEDURE — 99284 EMERGENCY DEPT VISIT MOD MDM: CPT | Mod: 25 | Performed by: FAMILY MEDICINE

## 2023-03-25 RX ORDER — PSEUDOEPHED/ACETAMINOPH/DIPHEN 30MG-500MG
500 TABLET ORAL EVERY 6 HOURS PRN
COMMUNITY
Start: 2022-08-01

## 2023-03-25 RX ORDER — LOSARTAN POTASSIUM AND HYDROCHLOROTHIAZIDE 12.5; 1 MG/1; MG/1
1 TABLET ORAL DAILY
Qty: 30 TABLET | Refills: 0 | Status: SHIPPED | OUTPATIENT
Start: 2023-03-25 | End: 2023-04-24

## 2023-03-25 RX ORDER — LOSARTAN POTASSIUM AND HYDROCHLOROTHIAZIDE 12.5; 1 MG/1; MG/1
1 TABLET ORAL
COMMUNITY
Start: 2022-08-01

## 2023-03-25 RX ORDER — GABAPENTIN 300 MG/1
300 CAPSULE ORAL 2 TIMES DAILY
COMMUNITY
Start: 2022-10-21

## 2023-03-25 ASSESSMENT — ACTIVITIES OF DAILY LIVING (ADL): ADLS_ACUITY_SCORE: 35

## 2023-03-25 NOTE — ED TRIAGE NOTES
Pt ran out of bp meds several weeks ago and has been feeling weak and achey in left arm and leg for about 2 weeks

## 2023-03-25 NOTE — ED PROVIDER NOTES
[facility] EMERGENCY DEPARTMENT ([facility])    3/25/23      ED PROVIDER NOTE    History   No chief complaint on file.    HPI  Amber KANU Valencia is a 63 year old female with past medical history significant for polyarthralgia, HTN, vitamin D deficiency, H. pylori who presents to the ED for left arm pain.  Patient reports she ran out of all of her medications including antihypertensive 5 or 6 weeks ago.  She developed left arm pain, headache, left leg pain, blurry vision about 2 weeks ago.  She says that she did lose her glasses as well so uncertain if it is what the blurry vision is related to.  She went to her neighbor's house today and told the neighbor about her symptoms, neighbor was concerned enough to bring her to the emergency department.  Reports that the entirety of her left arm is painful, especially to touch.  She initially felt it was a pulled muscle, but as it is persistent she became more concerned. She denies any chest pain, shortness of breath, although does note that when she is moving around at times she does get a little more winded.  She denies any leg swelling.  No nausea, vomiting, diarrhea.  Has not become diaphoretic.  No issues with balance or strength.   She does have an appointment scheduled at [facility] with her primary provider on April 5 establish care.    Patient is currently fasting, does not want to receive any fluids or medications while in the emergency department as a result of this.  Wants a prescription especially for her antihypertensive.        Past Medical History  No past medical history on file.  No past surgical history on file.  cholecalciferol 50 MCG (2000 UT) CAPS  losartan-hydrochlorothiazide (HYZAAR) 100-12.5 MG tablet  ACETAMINOPHEN EXTRA STRENGTH 500 MG tablet  gabapentin (NEURONTIN) 300 MG capsule  losartan-hydrochlorothiazide (HYZAAR) 100-12.5 MG tablet  metoclopramide (REGLAN) 5 MG tablet  omeprazole (PRILOSEC) 20 MG DR capsule      Allergies   Allergen  Reactions     Amlodipine Other (See Comments)     Reports eye lid swelling and eye itching while taking the medication     Family History  No family history on file.  Social History       Past medical history, past surgical history, medications, allergies, family history, and social history were reviewed with the patient. No additional pertinent items.      A medically appropriate review of systems was performed with pertinent positives and negatives noted in the HPI, and all other systems negative.    Physical Exam   BP: (!) 160/90  Pulse: 78  Temp: 98.4  F (36.9  C)  Resp: 14  Weight: 105.2 kg (232 lb)  SpO2: 97 %  Physical Exam  Vitals and nursing note reviewed.   Constitutional:       General: She is not in acute distress.     Appearance: She is not ill-appearing, toxic-appearing or diaphoretic.   HENT:      Head: Normocephalic and atraumatic.   Cardiovascular:      Rate and Rhythm: Normal rate and regular rhythm.      Heart sounds: Normal heart sounds.      Comments: hypertensive  Pulmonary:      Effort: Pulmonary effort is normal.      Breath sounds: Normal breath sounds.   Abdominal:      General: Abdomen is flat. There is no distension.      Palpations: Abdomen is soft.      Tenderness: There is no abdominal tenderness. There is no guarding.   Musculoskeletal:         General: Normal range of motion.   Skin:     General: Skin is warm and dry.      Capillary Refill: Capillary refill takes less than 2 seconds.   Neurological:      General: No focal deficit present.      Mental Status: She is alert and oriented to person, place, and time.   Psychiatric:         Mood and Affect: Mood normal.         Behavior: Behavior normal.         ED Course, Procedures, & Data      Procedures              EKG Interpretation:      Interpreted by Nelson Higginbotham MD  Time reviewed: On arrival  Symptoms at time of EKG: High blood pressure weakness  Rhythm: normal sinus   Rate: normal  Axis: NORMAL  Ectopy:  none  Conduction: normal  ST Segments/ T Waves: No ST-T wave changes  Q Waves: none  Comparison to prior: Unchanged    Clinical Impression: normal EKG      Medications - No data to display  Labs Ordered and Resulted from Time of ED Arrival to Time of ED Departure   COMPREHENSIVE METABOLIC PANEL - Abnormal       Result Value    Sodium 137      Potassium 4.1      Chloride 103      Carbon Dioxide (CO2) 23      Anion Gap 11      Urea Nitrogen 14.8      Creatinine 0.66      Calcium 10.2      Glucose 139 (*)     Alkaline Phosphatase 90      AST 37 (*)     ALT 53 (*)     Protein Total 8.2      Albumin 4.3      Bilirubin Total 0.3      GFR Estimate >90     TROPONIN T, HIGH SENSITIVITY - Normal    Troponin T, High Sensitivity 8     CBC WITH PLATELETS AND DIFFERENTIAL    WBC Count 5.5      RBC Count 4.88      Hemoglobin 14.5      Hematocrit 42.2      MCV 87      MCH 29.7      MCHC 34.4      RDW 12.5      Platelet Count 223      % Neutrophils 42      % Lymphocytes 46      % Monocytes 8      % Eosinophils 3      % Basophils 1      % Immature Granulocytes 0      NRBCs per 100 WBC 0      Absolute Neutrophils 2.3      Absolute Lymphocytes 2.5      Absolute Monocytes 0.4      Absolute Eosinophils 0.2      Absolute Basophils 0.0      Absolute Immature Granulocytes 0.0      Absolute NRBCs 0.0       No orders to display          Critical care was not performed.     Medical Decision Making  The patient's presentation was of moderate complexity (an acute illness with systemic symptoms).    The patient's evaluation involved:  ordering and/or review of 3+ test(s) in this encounter (see separate area of note for details)    The patient's management necessitated moderate risk (prescription drug management including medications given in the ED).      Assessment & Plan      Patient is agreeable to labs, she does not want any medications or fluids while in the emergency department as she is currently fasting for Ramadan.      I have reviewed  the nursing notes. I have reviewed the findings, diagnosis, plan and need for follow up with the patient.    Patient with history of hypertension at baseline not taking medications currently and unwilling to take them until after Ramadan [place] patient will be discharged from the emergency room with above evaluation including negative troponin negative labs and patient understands the importance of starting back on antihypertensives and following up with primary MD.          Discharge Medication List as of 3/25/2023  5:56 PM      START taking these medications    Details   !! losartan-hydrochlorothiazide (HYZAAR) 100-12.5 MG tablet Take 1 tablet by mouth daily for 30 days, Disp-30 tablet, R-0, Local Print       !! - Potential duplicate medications found. Please discuss with provider.          Final diagnoses:   Pain of left upper arm   Essential hypertension       MAGDY Mccallum [facility] EMERGENCY DEPARTMENT  3/25/2023     Nelson Higginbotham MD  03/25/23 [id number]

## 2023-03-25 NOTE — DISCHARGE INSTRUCTIONS
Your EKG and labs all looked good today.  Please keep your scheduled appointment to establish care with a new primary care provider for ongoing medication management for your hypertension as well as other concerns.  If you would like to establish care here please call the primary care Center at  and they can help you establish with a primary care doctor here at [facility].     Please return to the emergency department if you develop chest pain chest pressure, difficulty with your breathing, changes in your vision, difficulty with walking or speaking if you develop any new or worsening symptoms that are concerning to you.

## 2023-03-26 LAB
ATRIAL RATE - MUSE: 81 BPM
DIASTOLIC BLOOD PRESSURE - MUSE: NORMAL MMHG
INTERPRETATION ECG - MUSE: NORMAL
P AXIS - MUSE: 73 DEGREES
PR INTERVAL - MUSE: 174 MS
QRS DURATION - MUSE: 70 MS
QT - MUSE: 370 MS
QTC - MUSE: 429 MS
R AXIS - MUSE: 13 DEGREES
SYSTOLIC BLOOD PRESSURE - MUSE: NORMAL MMHG
T AXIS - MUSE: 39 DEGREES
VENTRICULAR RATE- MUSE: 81 BPM

## 2024-02-19 ENCOUNTER — APPOINTMENT (OUTPATIENT)
Dept: CT IMAGING | Facility: CLINIC | Age: 64
End: 2024-02-19
Attending: PHYSICIAN ASSISTANT
Payer: COMMERCIAL

## 2024-02-19 ENCOUNTER — HOSPITAL ENCOUNTER (EMERGENCY)
Facility: CLINIC | Age: 64
Discharge: HOME | End: 2024-02-19
Attending: EMERGENCY MEDICINE | Admitting: EMERGENCY MEDICINE
Payer: COMMERCIAL

## 2024-02-19 VITALS
SYSTOLIC BLOOD PRESSURE: 155 MMHG | TEMPERATURE: 98 F | OXYGEN SATURATION: 97 % | WEIGHT: 226.3 LBS | RESPIRATION RATE: 16 BRPM | HEIGHT: 67 IN | HEART RATE: 77 BPM | BODY MASS INDEX: 35.52 KG/M2 | DIASTOLIC BLOOD PRESSURE: 79 MMHG

## 2024-02-19 DIAGNOSIS — R42 VERTIGO: ICD-10-CM

## 2024-02-19 DIAGNOSIS — R73.9 HYPERGLYCEMIA: ICD-10-CM

## 2024-02-19 LAB
ALBUMIN SERPL BCG-MCNC: 4.1 G/DL (ref 3.5–5.2)
ALP SERPL-CCNC: 98 U/L (ref 40–150)
ALT SERPL W P-5'-P-CCNC: 28 U/L (ref 0–50)
ANION GAP SERPL CALCULATED.3IONS-SCNC: 16 MMOL/L (ref 7–15)
AST SERPL W P-5'-P-CCNC: 17 U/L (ref 0–45)
BASE EXCESS BLDV CALC-SCNC: 2.9 MMOL/L (ref -3–3)
BASOPHILS # BLD AUTO: 0 10E3/UL (ref 0–0.2)
BASOPHILS NFR BLD AUTO: 0 %
BILIRUB SERPL-MCNC: 0.4 MG/DL
BUN SERPL-MCNC: 22.1 MG/DL (ref 8–23)
CALCIUM SERPL-MCNC: 9.9 MG/DL (ref 8.8–10.2)
CHLORIDE SERPL-SCNC: 91 MMOL/L (ref 98–107)
CREAT SERPL-MCNC: 0.83 MG/DL (ref 0.51–0.95)
DEPRECATED HCO3 PLAS-SCNC: 22 MMOL/L (ref 22–29)
EGFRCR SERPLBLD CKD-EPI 2021: 78 ML/MIN/1.73M2
EOSINOPHIL # BLD AUTO: 0.1 10E3/UL (ref 0–0.7)
EOSINOPHIL NFR BLD AUTO: 1 %
ERYTHROCYTE [DISTWIDTH] IN BLOOD BY AUTOMATED COUNT: 11.9 % (ref 10–15)
GLUCOSE BLDC GLUCOMTR-MCNC: 358 MG/DL (ref 70–99)
GLUCOSE SERPL-MCNC: 501 MG/DL (ref 70–99)
HBA1C MFR BLD: 11.3 %
HCO3 BLDV-SCNC: 29 MMOL/L (ref 21–28)
HCT VFR BLD AUTO: 41.5 % (ref 35–47)
HGB BLD-MCNC: 14.8 G/DL (ref 11.7–15.7)
IMM GRANULOCYTES # BLD: 0 10E3/UL
IMM GRANULOCYTES NFR BLD: 0 %
LYMPHOCYTES # BLD AUTO: 2.6 10E3/UL (ref 0.8–5.3)
LYMPHOCYTES NFR BLD AUTO: 44 %
MCH RBC QN AUTO: 31.6 PG (ref 26.5–33)
MCHC RBC AUTO-ENTMCNC: 35.7 G/DL (ref 31.5–36.5)
MCV RBC AUTO: 89 FL (ref 78–100)
MONOCYTES # BLD AUTO: 0.4 10E3/UL (ref 0–1.3)
MONOCYTES NFR BLD AUTO: 7 %
NEUTROPHILS # BLD AUTO: 2.9 10E3/UL (ref 1.6–8.3)
NEUTROPHILS NFR BLD AUTO: 48 %
NRBC # BLD AUTO: 0 10E3/UL
NRBC BLD AUTO-RTO: 0 /100
O2/TOTAL GAS SETTING VFR VENT: 0 %
OXYHGB MFR BLDV: 52 % (ref 70–75)
PCO2 BLDV: 47 MM HG (ref 40–50)
PH BLDV: 7.39 [PH] (ref 7.32–7.43)
PLATELET # BLD AUTO: 247 10E3/UL (ref 150–450)
PO2 BLDV: 29 MM HG (ref 25–47)
POTASSIUM SERPL-SCNC: 4 MMOL/L (ref 3.4–5.3)
PROT SERPL-MCNC: 7.6 G/DL (ref 6.4–8.3)
RBC # BLD AUTO: 4.68 10E6/UL (ref 3.8–5.2)
SAO2 % BLDV: 53.2 % (ref 70–75)
SODIUM SERPL-SCNC: 129 MMOL/L (ref 135–145)
WBC # BLD AUTO: 6 10E3/UL (ref 4–11)

## 2024-02-19 PROCEDURE — 250N000013 HC RX MED GY IP 250 OP 250 PS 637: Performed by: PHYSICIAN ASSISTANT

## 2024-02-19 PROCEDURE — 82962 GLUCOSE BLOOD TEST: CPT

## 2024-02-19 PROCEDURE — 70450 CT HEAD/BRAIN W/O DYE: CPT

## 2024-02-19 PROCEDURE — 250N000011 HC RX IP 250 OP 636: Performed by: EMERGENCY MEDICINE

## 2024-02-19 PROCEDURE — 99285 EMERGENCY DEPT VISIT HI MDM: CPT | Mod: 25 | Performed by: EMERGENCY MEDICINE

## 2024-02-19 PROCEDURE — 83036 HEMOGLOBIN GLYCOSYLATED A1C: CPT | Performed by: EMERGENCY MEDICINE

## 2024-02-19 PROCEDURE — 258N000003 HC RX IP 258 OP 636: Performed by: EMERGENCY MEDICINE

## 2024-02-19 PROCEDURE — 82805 BLOOD GASES W/O2 SATURATION: CPT | Performed by: PHYSICIAN ASSISTANT

## 2024-02-19 PROCEDURE — 96360 HYDRATION IV INFUSION INIT: CPT | Mod: 59 | Performed by: EMERGENCY MEDICINE

## 2024-02-19 PROCEDURE — 36415 COLL VENOUS BLD VENIPUNCTURE: CPT | Performed by: PHYSICIAN ASSISTANT

## 2024-02-19 PROCEDURE — 85025 COMPLETE CBC W/AUTO DIFF WBC: CPT | Performed by: PHYSICIAN ASSISTANT

## 2024-02-19 PROCEDURE — 99284 EMERGENCY DEPT VISIT MOD MDM: CPT | Performed by: EMERGENCY MEDICINE

## 2024-02-19 PROCEDURE — 70496 CT ANGIOGRAPHY HEAD: CPT

## 2024-02-19 PROCEDURE — 250N000009 HC RX 250: Performed by: EMERGENCY MEDICINE

## 2024-02-19 PROCEDURE — 80053 COMPREHEN METABOLIC PANEL: CPT | Performed by: PHYSICIAN ASSISTANT

## 2024-02-19 RX ORDER — MECLIZINE HYDROCHLORIDE 25 MG/1
25 TABLET ORAL 3 TIMES DAILY PRN
Qty: 20 TABLET | Refills: 0 | Status: SHIPPED | OUTPATIENT
Start: 2024-02-19

## 2024-02-19 RX ORDER — IOPAMIDOL 755 MG/ML
100 INJECTION, SOLUTION INTRAVASCULAR ONCE
Status: COMPLETED | OUTPATIENT
Start: 2024-02-19 | End: 2024-02-19

## 2024-02-19 RX ORDER — MECLIZINE HYDROCHLORIDE 25 MG/1
25 TABLET ORAL ONCE
Status: COMPLETED | OUTPATIENT
Start: 2024-02-19 | End: 2024-02-19

## 2024-02-19 RX ADMIN — SODIUM CHLORIDE, POTASSIUM CHLORIDE, SODIUM LACTATE AND CALCIUM CHLORIDE 1000 ML: 600; 310; 30; 20 INJECTION, SOLUTION INTRAVENOUS at 19:20

## 2024-02-19 RX ADMIN — IOPAMIDOL 67 ML: 755 INJECTION, SOLUTION INTRAVENOUS at 19:56

## 2024-02-19 RX ADMIN — SODIUM CHLORIDE 80 ML: 9 INJECTION, SOLUTION INTRAVENOUS at 19:56

## 2024-02-19 RX ADMIN — MECLIZINE HYDROCHLORIDE 25 MG: 25 TABLET ORAL at 17:26

## 2024-02-19 ASSESSMENT — ACTIVITIES OF DAILY LIVING (ADL)
ADLS_ACUITY_SCORE: 35
ADLS_ACUITY_SCORE: 33
ADLS_ACUITY_SCORE: 35

## 2024-02-19 NOTE — ED PROVIDER NOTES
"ED Provider Note  [facility]      History     Chief Complaint   Patient presents with    Dizziness     Patient states that over the last four months she has been having progressively worse migraines and dizziness, today she could hardly stand up and move around. She also is having nausea and bloating.      HPI  63yo F pmhx HTN p/w intermittent, provoked dizziness x4 months, worse over the last few days.  Patient reports sensation of room spinning, that is provoked with head movements, and positional changes (primarily laying to seated/ standing).  She will have some associated nausea, but no vomiting.  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    Past Medical History  No past medical history on file.  No past surgical history on file.  meclizine (ANTIVERT) 25 MG tablet  ACETAMINOPHEN EXTRA STRENGTH 500 MG tablet  cholecalciferol 50 MCG (2000 UT) CAPS  gabapentin (NEURONTIN) 300 MG capsule  losartan-hydrochlorothiazide (HYZAAR) 100-12.5 MG tablet  losartan-hydrochlorothiazide (HYZAAR) 100-12.5 MG tablet  metoclopramide (REGLAN) 5 MG tablet  omeprazole (PRILOSEC) 20 MG DR capsule      Allergies   Allergen Reactions    Amlodipine Other (See Comments)     Reports eye lid swelling and eye itching while taking the medication     Family History  No family history on file.  Social History          A medically appropriate review of systems was performed with pertinent positives and negatives noted in the HPI, and all other systems negative.    Physical Exam   BP: 129/70  Pulse: 85  Temp: 97.8  F (36.6  C)  Resp: 18  Height: 170.2 cm (5' 7\")  Weight: 102.6 kg (226 lb 4.8 oz)  SpO2: 99 %  Physical Exam  Constitutional:       General: She is not in acute distress.     Appearance: Normal appearance. She is well-developed.   HENT: "      Head: Normocephalic and atraumatic.   Eyes:      Extraocular Movements:      Right eye: No nystagmus.      Left eye: No nystagmus.      Conjunctiva/sclera: Conjunctivae normal.      Pupils: Pupils are equal, round, and reactive to light.   Cardiovascular:      Rate and Rhythm: Normal rate.   Pulmonary:      Effort: Pulmonary effort is normal. No respiratory distress.   Abdominal:      General: Abdomen is flat.   Musculoskeletal:      Cervical back: Normal range of motion and neck supple.   Skin:     General: Skin is warm and dry.      Findings: No rash.   Neurological:      Mental Status: She is alert and oriented to person, place, and time.      GCS: GCS eye subscore is 4. GCS verbal subscore is 5. GCS motor subscore is 6.      Cranial Nerves: Cranial nerves 2-12 are intact.      Motor: Motor function is intact. No weakness.      Coordination: Coordination is intact. Finger-Nose-Finger Test normal.      Comments: Normal HINTS exam  Pt with provoked symptoms when sitting up and (+) vashti hallpike           ED Course, Procedures, & Data       Procedures                      Results for orders placed or performed during the hospital encounter of 02/19/24   CT Head w/o Contrast     Status: None    Narrative    EXAM: CT HEAD W/O CONTRAST  LOCATION: [facility]  DATE: 2/19/2024    INDICATION: Dizziness  COMPARISON: None.  TECHNIQUE: Routine CT Head without IV contrast. Multiplanar reformats. Dose reduction techniques were used.    FINDINGS:  INTRACRANIAL CONTENTS: No intracranial hemorrhage, extraaxial collection, or mass effect.  No CT evidence of acute infarct. Normal parenchymal attenuation for age. Normal ventricles and sulci for age.     VISUALIZED ORBITS/SINUSES/MASTOIDS: No intraorbital abnormality. No significant paranasal sinus mucosal disease. No middle ear or mastoid effusion.    BONES/SOFT TISSUES: No acute abnormality.      Impression    IMPRESSION:  1.  No  acute intracranial process.   CTA Head Neck with Contrast     Status: None    Narrative    EXAM: CTA HEAD NECK W CONTRAST  LOCATION: [facility]  DATE/TIME: 2/19/2024 8:15 PM CST    INDICATION: Dizziness  COMPARISON: Same day CT head without contrast  CONTRAST: 67 mL Isovue 370  TECHNIQUE: Head and neck CT angiogram with IV contrast. Axial helical CT images of the head and neck vessels obtained during the arterial phase of intravenous contrast administration. Axial 2D reconstructed images and multiplanar 3D MIP reconstructed   images of the head and neck vessels were performed by the technologist. Dose reduction techniques were used. All stenosis measurements made according to NASCET criteria unless otherwise specified.    FINDINGS:   HEAD CTA:  ANTERIOR CIRCULATION: No stenosis/occlusion, aneurysm, or high flow vascular malformation. Fetal origin of the right posterior cerebral artery from the anterior circulation.    POSTERIOR CIRCULATION: No stenosis/occlusion, aneurysm, or high flow vascular malformation. Balanced vertebral arteries supply a normal basilar artery.     DURAL VENOUS SINUSES: Expected enhancement of the major dural venous sinuses.    NECK CTA:  RIGHT CAROTID: No measurable stenosis or dissection.    LEFT CAROTID: No measurable stenosis or dissection.    VERTEBRAL ARTERIES: No focal stenosis or dissection. Balanced vertebral arteries.    AORTIC ARCH: Classic aortic arch anatomy with no significant stenosis at the origin of the great vessels.    NONVASCULAR STRUCTURES: Unremarkable.      Impression    IMPRESSION:   HEAD CTA:   1.  No significant stenosis, aneurysm, or high flow vascular malformation identified.  2.  Variant [place] anatomy as above.    NECK CTA:  1.  No large vessel occlusion or hemodynamically significant stenosis.   Comprehensive metabolic panel     Status: Abnormal   Result Value Ref Range    Sodium 129 (L) 135 - 145 mmol/L     Potassium 4.0 3.4 - 5.3 mmol/L    Carbon Dioxide (CO2) 22 22 - 29 mmol/L    Anion Gap 16 (H) 7 - 15 mmol/L    Urea Nitrogen 22.1 8.0 - 23.0 mg/dL    Creatinine 0.83 0.51 - 0.95 mg/dL    GFR Estimate 78 >60 mL/min/1.73m2    Calcium 9.9 8.8 - 10.2 mg/dL    Chloride 91 (L) 98 - 107 mmol/L    Glucose 501 (HH) 70 - 99 mg/dL    Alkaline Phosphatase 98 40 - 150 U/L    AST 17 0 - 45 U/L    ALT 28 0 - 50 U/L    Protein Total 7.6 6.4 - 8.3 g/dL    Albumin 4.1 3.5 - 5.2 g/dL    Bilirubin Total 0.4 <=1.2 mg/dL   CBC with platelets and differential     Status: None   Result Value Ref Range    WBC Count 6.0 4.0 - 11.0 10e3/uL    RBC Count 4.68 3.80 - 5.20 10e6/uL    Hemoglobin 14.8 11.7 - 15.7 g/dL    Hematocrit 41.5 35.0 - 47.0 %    MCV 89 78 - 100 fL    MCH 31.6 26.5 - 33.0 pg    MCHC 35.7 31.5 - 36.5 g/dL    RDW 11.9 10.0 - 15.0 %    Platelet Count 247 150 - 450 10e3/uL    % Neutrophils 48 %    % Lymphocytes 44 %    % Monocytes 7 %    % Eosinophils 1 %    % Basophils 0 %    % Immature Granulocytes 0 %    NRBCs per 100 WBC 0 <1 /100    Absolute Neutrophils 2.9 1.6 - 8.3 10e3/uL    Absolute Lymphocytes 2.6 0.8 - 5.3 10e3/uL    Absolute Monocytes 0.4 0.0 - 1.3 10e3/uL    Absolute Eosinophils 0.1 0.0 - 0.7 10e3/uL    Absolute Basophils 0.0 0.0 - 0.2 10e3/uL    Absolute Immature Granulocytes 0.0 <=0.4 10e3/uL    Absolute NRBCs 0.0 10e3/uL   Blood gas venous     Status: Abnormal   Result Value Ref Range    pH Venous 7.39 7.32 - 7.43    pCO2 Venous 47 40 - 50 mm Hg    pO2 Venous 29 25 - 47 mm Hg    Bicarbonate Venous 29 (H) 21 - 28 mmol/L    Base Excess/Deficit Venous 2.9 -3.0 - 3.0 mmol/L    FIO2 0     Oxyhemoglobin Venous 52 (L) 70 - 75 %    O2 Sat, Venous 53.2 (L) 70.0 - 75.0 %    Narrative    In healthy individuals, oxyhemoglobin (O2Hb) and oxygen saturation (SO2) are approximately equal. In the presence of dyshemoglobins, oxyhemoglobin can be considerably lower than oxygen saturation.   Hemoglobin A1c     Status: Abnormal    Result Value Ref Range    Hemoglobin A1C 11.3 (H) <5.7 %   CBC with platelets differential     Status: None    Narrative    The following orders were created for panel order CBC with platelets differential.  Procedure                               Abnormality         Status                     ---------                               -----------         ------                     CBC with platelets and d...[[id number]]                      Final result                 Please view results for these tests on the individual orders.     Medications   meclizine (ANTIVERT) tablet 25 mg (25 mg Oral $Given 2/19/24 1726)   lactated ringers BOLUS 1,000 mL (0 mLs Intravenous Stopped 2/19/24 2046)   iopamidol (ISOVUE-370) solution 100 mL (67 mLs Intravenous $Given 2/19/24 1956)   sodium chloride 0.9 % bag 100mL (80 mLs Intravenous $Given 2/19/24 1956)     Labs Ordered and Resulted from Time of ED Arrival to Time of ED Departure   COMPREHENSIVE METABOLIC PANEL - Abnormal       Result Value    Sodium 129 (*)     Potassium 4.0      Carbon Dioxide (CO2) 22      Anion Gap 16 (*)     Urea Nitrogen 22.1      Creatinine 0.83      GFR Estimate 78      Calcium 9.9      Chloride 91 (*)     Glucose 501 (*)     Alkaline Phosphatase 98      AST 17      ALT 28      Protein Total 7.6      Albumin 4.1      Bilirubin Total 0.4     BLOOD GAS VENOUS - Abnormal    pH Venous 7.39      pCO2 Venous 47      pO2 Venous 29      Bicarbonate Venous 29 (*)     Base Excess/Deficit Venous 2.9      FIO2 0      Oxyhemoglobin Venous 52 (*)     O2 Sat, Venous 53.2 (*)    HEMOGLOBIN A1C - Abnormal    Hemoglobin A1C 11.3 (*)    CBC WITH PLATELETS AND DIFFERENTIAL    WBC Count 6.0      RBC Count 4.68      Hemoglobin 14.8      Hematocrit 41.5      MCV 89      MCH 31.6      MCHC 35.7      RDW 11.9      Platelet Count 247      % Neutrophils 48      % Lymphocytes 44      % Monocytes 7      % Eosinophils 1      % Basophils 0      % Immature Granulocytes 0      NRBCs per  100 WBC 0      Absolute Neutrophils 2.9      Absolute Lymphocytes 2.6      Absolute Monocytes 0.4      Absolute Eosinophils 0.1      Absolute Basophils 0.0      Absolute Immature Granulocytes 0.0      Absolute NRBCs 0.0     GLUCOSE MONITOR NURSING POCT     CTA Head Neck with Contrast   Final Result   IMPRESSION:    HEAD CTA:    1.  No significant stenosis, aneurysm, or high flow vascular malformation identified.   2.  Variant [place] anatomy as above.      NECK CTA:   1.  No large vessel occlusion or hemodynamically significant stenosis.      CT Head w/o Contrast   Final Result   IMPRESSION:   1.  No acute intracranial process.             Critical care was not performed.   Medical Decision Making  The patient's presentation was of high complexity (an acute health issue posing potential threat to life or bodily function).    The patient's evaluation involved:  an assessment requiring an independent historian (family)  review of external note(s) from 3+ sources (prior clinic)  ordering and/or review of 3+ test(s) in this encounter (see separate area of note for details)    The patient's management necessitated high risk (a decision regarding hospitalization).           Assessment & Plan    63yo F pmhx HTN p/w intermittent, provoked dizziness x4 months, worse over the last few days. Provoked with positional changes and head movements. In ED, becomes a acutely symptomatic, when sitting upright for neurologic exam.  Neurologically intact.  Normal hints exam.  Does have positive Bostic-Hallpike.  No obvious nystagmus, but some difficulty in giving her exam secondary language difficulty. As symptoms likely peripheral in nature, trial with meclizine  However, given patient's age, will obtain Noncon head CT and CTA head and neck to assess central neuro etiologies. Please see full ED course, results, and disposition  detailed below. Anticipate discharge assuming improvement in symptoms and negative imaging.    ED  Course as of 02/19/24 2057   Mon Feb 19, 2024   1825 Resolution of symptoms with meclizine.    1825 CBC with platelets differential  WNL   1854 Comprehensive metabolic panel(!!)  Glucose 501.  Minimal gap at 16.  Normal bicarb.  pH normal.  Not consistent with DKA.    Sodium 129 but corrects to normal when glucose accounted for.  Will give IV fluids.    Family reports patient has been diagnosed as diabetic, and has p.o. medication at home (?metformin), but patient has not yet been taking, because she has been in denial about the diabetes.   2042 CT Head w/o Contrast  Negative.    2047 CTA Head Neck with Contrast  Negative.    2047 Negative imaging and pt's significant symptomatic improvement with meclizine, suggest peripheral etiology for symptoms.  Patient was provided prescription for meclizine.  Given instructions for Epley maneuvers.  Advise follow-up with ENT as needed.  Regarding patient's hyperglycemia, after IV fluids this improved to 358.  She was advised to take the diabetes medicine that she has been previously prescribed, and follow-up with her primary doctor for ongoing care.  ER return precautions given.         I have reviewed the nursing notes. I have reviewed the findings, diagnosis, plan and need for follow up with the patient.    New Prescriptions    MECLIZINE (ANTIVERT) 25 MG TABLET    Take 1 tablet (25 mg) by mouth 3 times daily as needed for dizziness       Final diagnoses:   Vertigo   Hyperglycemia         Corwin Patton PA-C  [facility] EMERGENCY DEPARTMENT  2/19/2024     Corwin Patton PA-C  02/19/24 2057    --    ED Attending Physician Attestation    I Timmy Frances MD, cared for this patient with the Advanced Practice Provider (JENNIFER). I have performed a history and physical examination of the patient independent of the JENNIFER. I reviewed the JENNIFER's documentation above and agree with the documented findings and plan of care. I personally provided a substantive portion  of the care for this patient, including the complete Medical Decision Making. Please see the JENNIFER's documentation for full details.    Summary of HPI, PE, ED Course   Patient is a 64 year old female evaluated in the emergency department for dizziness. Exam and ED course notable for nontoxic on exam; neuro intact, reassuring workup with imaging. After the completion of care in the emergency department, the patient was discharged.    Critical Care & Procedures  Not applicable.        Medical Decision Making  The patient's presentation was of high complexity (an acute health issue posing potential threat to life or bodily function).    The patient's evaluation involved:  review of external note(s) from 1 sources (see separate area of note for details)  review of 3+ test result(s) ordered prior to this encounter (see separate area of note for details)  ordering and/or review of 3+ test(s) in this encounter (see separate area of note for details)    The patient's management necessitated moderate risk (prescription drug management including medications given in the ED).          Timmy Frances MD  Emergency Medicine          Timmy Frances MD  02/21/24 [id number]

## 2024-02-19 NOTE — ED TRIAGE NOTES
Patient states that over the last four months she has been having progressively worse migraines and dizziness, today she could hardly stand up and move around. She also is having nausea and bloating.      Triage Assessment (Adult)       Row Name 02/19/24 7630          Triage Assessment    Airway WDL WDL        Respiratory WDL    Respiratory WDL WDL        Skin Circulation/Temperature WDL    Skin Circulation/Temperature WDL WDL        Cardiac WDL    Cardiac WDL WDL        Peripheral/Neurovascular WDL    Peripheral Neurovascular WDL WDL        Cognitive/Neuro/Behavioral WDL    Cognitive/Neuro/Behavioral WDL WDL

## 2024-02-20 NOTE — DISCHARGE INSTRUCTIONS
Please make an appointment to follow up with Your Primary Care Provider and Primary Care Center (phone: [phone]).  Return to the ER for worsening symptoms.    You should attempt Epley exercises for your vertigo.  You can search YouTube for these, and follow the instructions on the video.  Follow-up with the ear nose and throat specialist as needed.

## 2025-03-19 ENCOUNTER — HOSPITAL ENCOUNTER (INPATIENT)
Facility: CLINIC | Age: 65
End: 2025-03-19
Attending: EMERGENCY MEDICINE | Admitting: HOSPITALIST
Payer: COMMERCIAL

## 2025-03-19 ENCOUNTER — APPOINTMENT (OUTPATIENT)
Dept: GENERAL RADIOLOGY | Facility: CLINIC | Age: 65
End: 2025-03-19
Attending: EMERGENCY MEDICINE
Payer: COMMERCIAL

## 2025-03-19 DIAGNOSIS — R11.0 NAUSEA: ICD-10-CM

## 2025-03-19 DIAGNOSIS — N17.9 AKI (ACUTE KIDNEY INJURY): ICD-10-CM

## 2025-03-19 DIAGNOSIS — R10.13 ACUTE EPIGASTRIC PAIN: Primary | ICD-10-CM

## 2025-03-19 DIAGNOSIS — K29.00 ACUTE GASTRITIS WITHOUT HEMORRHAGE, UNSPECIFIED GASTRITIS TYPE: ICD-10-CM

## 2025-03-19 LAB
ALBUMIN SERPL BCG-MCNC: 4.1 G/DL (ref 3.5–5.2)
ALBUMIN UR-MCNC: NEGATIVE MG/DL
ALP SERPL-CCNC: 58 U/L (ref 40–150)
ALT SERPL W P-5'-P-CCNC: 29 U/L (ref 0–50)
ANION GAP SERPL CALCULATED.3IONS-SCNC: 17 MMOL/L (ref 7–15)
APPEARANCE UR: CLEAR
AST SERPL W P-5'-P-CCNC: 26 U/L (ref 0–45)
ATRIAL RATE - MUSE: 89 BPM
BASOPHILS # BLD AUTO: 0 10E3/UL (ref 0–0.2)
BASOPHILS NFR BLD AUTO: 0 %
BILIRUB SERPL-MCNC: 0.6 MG/DL
BILIRUB UR QL STRIP: NEGATIVE
BUN SERPL-MCNC: 19.2 MG/DL (ref 8–23)
CALCIUM SERPL-MCNC: 10.1 MG/DL (ref 8.8–10.4)
CHLORIDE SERPL-SCNC: 98 MMOL/L (ref 98–107)
COLOR UR AUTO: ABNORMAL
CREAT SERPL-MCNC: 2.58 MG/DL (ref 0.51–0.95)
DIASTOLIC BLOOD PRESSURE - MUSE: NORMAL MMHG
EGFRCR SERPLBLD CKD-EPI 2021: 20 ML/MIN/1.73M2
EOSINOPHIL # BLD AUTO: 0.1 10E3/UL (ref 0–0.7)
EOSINOPHIL NFR BLD AUTO: 2 %
ERYTHROCYTE [DISTWIDTH] IN BLOOD BY AUTOMATED COUNT: 11.9 % (ref 10–15)
EST. AVERAGE GLUCOSE BLD GHB EST-MCNC: 154 MG/DL
GLUCOSE BLDC GLUCOMTR-MCNC: 104 MG/DL (ref 70–99)
GLUCOSE BLDC GLUCOMTR-MCNC: 115 MG/DL (ref 70–99)
GLUCOSE BLDC GLUCOMTR-MCNC: 135 MG/DL (ref 70–99)
GLUCOSE SERPL-MCNC: 146 MG/DL (ref 70–99)
GLUCOSE UR STRIP-MCNC: NEGATIVE MG/DL
HBA1C MFR BLD: 7 %
HCO3 SERPL-SCNC: 23 MMOL/L (ref 22–29)
HCT VFR BLD AUTO: 36.2 % (ref 35–47)
HGB BLD-MCNC: 12.9 G/DL (ref 11.7–15.7)
HGB UR QL STRIP: NEGATIVE
HYALINE CASTS: 14 /LPF
IMM GRANULOCYTES # BLD: 0 10E3/UL
IMM GRANULOCYTES NFR BLD: 0 %
INTERPRETATION ECG - MUSE: NORMAL
KETONES UR STRIP-MCNC: NEGATIVE MG/DL
LEUKOCYTE ESTERASE UR QL STRIP: NEGATIVE
LIPASE SERPL-CCNC: 17 U/L (ref 13–60)
LYMPHOCYTES # BLD AUTO: 1.7 10E3/UL (ref 0.8–5.3)
LYMPHOCYTES NFR BLD AUTO: 35 %
MCH RBC QN AUTO: 30.9 PG (ref 26.5–33)
MCHC RBC AUTO-ENTMCNC: 35.6 G/DL (ref 31.5–36.5)
MCV RBC AUTO: 87 FL (ref 78–100)
MONOCYTES # BLD AUTO: 0.7 10E3/UL (ref 0–1.3)
MONOCYTES NFR BLD AUTO: 15 %
MUCOUS THREADS #/AREA URNS LPF: PRESENT /LPF
NEUTROPHILS # BLD AUTO: 2.3 10E3/UL (ref 1.6–8.3)
NEUTROPHILS NFR BLD AUTO: 48 %
NITRATE UR QL: NEGATIVE
NRBC # BLD AUTO: 0 10E3/UL
NRBC BLD AUTO-RTO: 0 /100
P AXIS - MUSE: 59 DEGREES
PH UR STRIP: 5.5 [PH] (ref 5–7)
PLATELET # BLD AUTO: 249 10E3/UL (ref 150–450)
POTASSIUM SERPL-SCNC: 3.5 MMOL/L (ref 3.4–5.3)
PR INTERVAL - MUSE: 144 MS
PROT SERPL-MCNC: 7.4 G/DL (ref 6.4–8.3)
QRS DURATION - MUSE: 72 MS
QT - MUSE: 342 MS
QTC - MUSE: 416 MS
R AXIS - MUSE: 39 DEGREES
RBC # BLD AUTO: 4.18 10E6/UL (ref 3.8–5.2)
RBC URINE: 1 /HPF
SODIUM SERPL-SCNC: 138 MMOL/L (ref 135–145)
SP GR UR STRIP: 1.01 (ref 1–1.03)
SQUAMOUS EPITHELIAL: 1 /HPF
SYSTOLIC BLOOD PRESSURE - MUSE: NORMAL MMHG
T AXIS - MUSE: 45 DEGREES
UROBILINOGEN UR STRIP-MCNC: NORMAL MG/DL
VENTRICULAR RATE- MUSE: 89 BPM
WBC # BLD AUTO: 4.9 10E3/UL (ref 4–11)
WBC URINE: 3 /HPF

## 2025-03-19 PROCEDURE — 99207 PR APP CREDIT; MD BILLING SHARED VISIT: CPT | Mod: FS | Performed by: INTERNAL MEDICINE

## 2025-03-19 PROCEDURE — 80053 COMPREHEN METABOLIC PANEL: CPT | Performed by: EMERGENCY MEDICINE

## 2025-03-19 PROCEDURE — 250N000013 HC RX MED GY IP 250 OP 250 PS 637

## 2025-03-19 PROCEDURE — 93010 ELECTROCARDIOGRAM REPORT: CPT | Performed by: EMERGENCY MEDICINE

## 2025-03-19 PROCEDURE — 258N000003 HC RX IP 258 OP 636: Performed by: EMERGENCY MEDICINE

## 2025-03-19 PROCEDURE — 83036 HEMOGLOBIN GLYCOSYLATED A1C: CPT

## 2025-03-19 PROCEDURE — 93005 ELECTROCARDIOGRAM TRACING: CPT | Performed by: EMERGENCY MEDICINE

## 2025-03-19 PROCEDURE — 74019 RADEX ABDOMEN 2 VIEWS: CPT

## 2025-03-19 PROCEDURE — 85025 COMPLETE CBC W/AUTO DIFF WBC: CPT | Performed by: EMERGENCY MEDICINE

## 2025-03-19 PROCEDURE — 82962 GLUCOSE BLOOD TEST: CPT

## 2025-03-19 PROCEDURE — 36415 COLL VENOUS BLD VENIPUNCTURE: CPT | Performed by: EMERGENCY MEDICINE

## 2025-03-19 PROCEDURE — 96360 HYDRATION IV INFUSION INIT: CPT | Performed by: EMERGENCY MEDICINE

## 2025-03-19 PROCEDURE — 99418 PROLNG IP/OBS E/M EA 15 MIN: CPT | Mod: FS

## 2025-03-19 PROCEDURE — 99285 EMERGENCY DEPT VISIT HI MDM: CPT | Mod: 25 | Performed by: EMERGENCY MEDICINE

## 2025-03-19 PROCEDURE — 250N000011 HC RX IP 250 OP 636: Performed by: EMERGENCY MEDICINE

## 2025-03-19 PROCEDURE — 81001 URINALYSIS AUTO W/SCOPE: CPT

## 2025-03-19 PROCEDURE — 99285 EMERGENCY DEPT VISIT HI MDM: CPT | Performed by: EMERGENCY MEDICINE

## 2025-03-19 PROCEDURE — 71046 X-RAY EXAM CHEST 2 VIEWS: CPT

## 2025-03-19 PROCEDURE — 258N000003 HC RX IP 258 OP 636

## 2025-03-19 PROCEDURE — 83690 ASSAY OF LIPASE: CPT | Performed by: EMERGENCY MEDICINE

## 2025-03-19 PROCEDURE — 99223 1ST HOSP IP/OBS HIGH 75: CPT | Mod: FS

## 2025-03-19 PROCEDURE — 120N000002 HC R&B MED SURG/OB UMMC

## 2025-03-19 RX ORDER — PANTOPRAZOLE SODIUM 40 MG/1
40 TABLET, DELAYED RELEASE ORAL
Status: DISCONTINUED | OUTPATIENT
Start: 2025-03-19 | End: 2025-03-19

## 2025-03-19 RX ORDER — DEXTROSE MONOHYDRATE 25 G/50ML
25-50 INJECTION, SOLUTION INTRAVENOUS
Status: DISCONTINUED | OUTPATIENT
Start: 2025-03-19 | End: 2025-03-21 | Stop reason: HOSPADM

## 2025-03-19 RX ORDER — CALCIUM CARBONATE 500 MG/1
1000 TABLET, CHEWABLE ORAL 4 TIMES DAILY PRN
Status: DISCONTINUED | OUTPATIENT
Start: 2025-03-19 | End: 2025-03-21 | Stop reason: HOSPADM

## 2025-03-19 RX ORDER — PROCHLORPERAZINE MALEATE 5 MG/1
5 TABLET ORAL EVERY 6 HOURS PRN
Status: DISCONTINUED | OUTPATIENT
Start: 2025-03-19 | End: 2025-03-21 | Stop reason: HOSPADM

## 2025-03-19 RX ORDER — PANTOPRAZOLE SODIUM 20 MG/1
20 TABLET, DELAYED RELEASE ORAL
Status: DISCONTINUED | OUTPATIENT
Start: 2025-03-20 | End: 2025-03-21 | Stop reason: HOSPADM

## 2025-03-19 RX ORDER — SODIUM CHLORIDE 9 MG/ML
INJECTION, SOLUTION INTRAVENOUS CONTINUOUS
Status: DISCONTINUED | OUTPATIENT
Start: 2025-03-19 | End: 2025-03-20

## 2025-03-19 RX ORDER — LACTULOSE 10 G/10G
20 SOLUTION ORAL 3 TIMES DAILY
Status: DISCONTINUED | OUTPATIENT
Start: 2025-03-19 | End: 2025-03-21 | Stop reason: HOSPADM

## 2025-03-19 RX ORDER — SITAGLIPTIN AND METFORMIN HYDROCHLORIDE 1000; 50 MG/1; MG/1
1 TABLET, FILM COATED ORAL 2 TIMES DAILY WITH MEALS
COMMUNITY

## 2025-03-19 RX ORDER — ONDANSETRON 4 MG/1
4 TABLET, ORALLY DISINTEGRATING ORAL EVERY 6 HOURS PRN
Status: DISCONTINUED | OUTPATIENT
Start: 2025-03-19 | End: 2025-03-21 | Stop reason: HOSPADM

## 2025-03-19 RX ORDER — ACETAMINOPHEN 650 MG/1
650 SUPPOSITORY RECTAL EVERY 4 HOURS PRN
Status: DISCONTINUED | OUTPATIENT
Start: 2025-03-19 | End: 2025-03-21 | Stop reason: HOSPADM

## 2025-03-19 RX ORDER — SODIUM CHLORIDE 9 MG/ML
INJECTION, SOLUTION INTRAVENOUS CONTINUOUS
Status: DISCONTINUED | OUTPATIENT
Start: 2025-03-19 | End: 2025-03-19

## 2025-03-19 RX ORDER — ONDANSETRON 2 MG/ML
4 INJECTION INTRAMUSCULAR; INTRAVENOUS EVERY 6 HOURS PRN
Status: DISCONTINUED | OUTPATIENT
Start: 2025-03-19 | End: 2025-03-21 | Stop reason: HOSPADM

## 2025-03-19 RX ORDER — SUCRALFATE 1 G/1
1 TABLET ORAL
Status: DISCONTINUED | OUTPATIENT
Start: 2025-03-19 | End: 2025-03-21 | Stop reason: HOSPADM

## 2025-03-19 RX ORDER — OMEPRAZOLE 20 MG/1
20 CAPSULE, DELAYED RELEASE ORAL
Status: DISCONTINUED | OUTPATIENT
Start: 2025-03-20 | End: 2025-03-19

## 2025-03-19 RX ORDER — NICOTINE POLACRILEX 4 MG
15-30 LOZENGE BUCCAL
Status: DISCONTINUED | OUTPATIENT
Start: 2025-03-19 | End: 2025-03-21 | Stop reason: HOSPADM

## 2025-03-19 RX ORDER — AMOXICILLIN 250 MG
1 CAPSULE ORAL 2 TIMES DAILY PRN
Status: DISCONTINUED | OUTPATIENT
Start: 2025-03-19 | End: 2025-03-21 | Stop reason: HOSPADM

## 2025-03-19 RX ORDER — AMOXICILLIN 250 MG
2 CAPSULE ORAL 2 TIMES DAILY PRN
Status: DISCONTINUED | OUTPATIENT
Start: 2025-03-19 | End: 2025-03-21 | Stop reason: HOSPADM

## 2025-03-19 RX ORDER — MICONAZOLE NITRATE 20 MG/G
CREAM TOPICAL 2 TIMES DAILY
Status: DISCONTINUED | OUTPATIENT
Start: 2025-03-19 | End: 2025-03-21 | Stop reason: HOSPADM

## 2025-03-19 RX ORDER — POLYETHYLENE GLYCOL 400 AND PROPYLENE GLYCOL 4; 3 MG/ML; MG/ML
1 SOLUTION/ DROPS OPHTHALMIC EVERY 6 HOURS PRN
COMMUNITY
Start: 2025-02-27

## 2025-03-19 RX ORDER — ACETAMINOPHEN 325 MG/1
650 TABLET ORAL EVERY 4 HOURS PRN
Status: DISCONTINUED | OUTPATIENT
Start: 2025-03-19 | End: 2025-03-21 | Stop reason: HOSPADM

## 2025-03-19 RX ORDER — GABAPENTIN 300 MG/1
300 CAPSULE ORAL 2 TIMES DAILY
Status: DISCONTINUED | OUTPATIENT
Start: 2025-03-19 | End: 2025-03-19

## 2025-03-19 RX ORDER — LIDOCAINE 40 MG/G
CREAM TOPICAL
Status: DISCONTINUED | OUTPATIENT
Start: 2025-03-19 | End: 2025-03-21 | Stop reason: HOSPADM

## 2025-03-19 RX ORDER — MECLIZINE HYDROCHLORIDE 25 MG/1
25 TABLET ORAL 3 TIMES DAILY PRN
Status: DISCONTINUED | OUTPATIENT
Start: 2025-03-19 | End: 2025-03-19

## 2025-03-19 RX ADMIN — SODIUM CHLORIDE 500 ML: 0.9 INJECTION, SOLUTION INTRAVENOUS at 12:48

## 2025-03-19 RX ADMIN — SUCRALFATE 1 G: 1 TABLET ORAL at 21:21

## 2025-03-19 RX ADMIN — SODIUM CHLORIDE: 0.9 INJECTION, SOLUTION INTRAVENOUS at 13:47

## 2025-03-19 RX ADMIN — LACTULOSE 20 G: 20 POWDER, FOR SOLUTION ORAL at 21:21

## 2025-03-19 RX ADMIN — FAMOTIDINE 20 MG: 10 INJECTION, SOLUTION INTRAVENOUS at 14:04

## 2025-03-19 RX ADMIN — SODIUM CHLORIDE: 0.9 INJECTION, SOLUTION INTRAVENOUS at 16:12

## 2025-03-19 ASSESSMENT — ACTIVITIES OF DAILY LIVING (ADL)
ADLS_ACUITY_SCORE: 41

## 2025-03-19 ASSESSMENT — COLUMBIA-SUICIDE SEVERITY RATING SCALE - C-SSRS
1. IN THE PAST MONTH, HAVE YOU WISHED YOU WERE DEAD OR WISHED YOU COULD GO TO SLEEP AND NOT WAKE UP?: NO
2. HAVE YOU ACTUALLY HAD ANY THOUGHTS OF KILLING YOURSELF IN THE PAST MONTH?: NO
6. HAVE YOU EVER DONE ANYTHING, STARTED TO DO ANYTHING, OR PREPARED TO DO ANYTHING TO END YOUR LIFE?: NO

## 2025-03-19 NOTE — ED PROVIDER NOTES
"ED PROVIDER NOTE  [facility]  ED 9   History     Chief Complaint   Patient presents with    Abdominal Pain     Pain (\"pressure, like wanting to vomit\") to upper abdomen for over ten days, nausea and vomiting, no appetite, last bowel movement three days ago, small bowel movements, lower back pain     HPI  Amber B Erik is a 65 year old [place] female who presents to the emergency department with complaints of upper abdominal discomfort that she has had over the last 10 days.  The patient states that she has had no vomiting but has had some nausea with decreased appetite and her last bowel movement was 3 days ago.  Patient denies any fevers, any coughing, or shortness of breath and presents to the ER for evaluation.    This part of the document was transcribed by Brandee Cervantes, Medical Scribe.      I have reviewed the Medications, Allergies, Past Medical and Surgical History, and Social History in the Epic system.    No past medical history on file.    No past surgical history on file.        Dose / Directions   acetaminophen 500 MG tablet  Commonly known as: TYLENOL      Dose: 500 mg  Take 500 mg by mouth every 6 hours as needed  Refills: 0     cholecalciferol 50 MCG (2000 UT) Caps      Refills: 0     gabapentin 300 MG capsule  Commonly known as: NEURONTIN      Dose: 300 mg  Take 300 mg by mouth 2 times daily  Refills: 0     losartan-hydrochlorothiazide 100-12.5 MG tablet  Commonly known as: HYZAAR      Dose: 1 tablet  Take 1 tablet by mouth daily at 2 pm  Refills: 0     meclizine 25 MG tablet  Commonly known as: ANTIVERT      Dose: 25 mg  Take 1 tablet (25 mg) by mouth 3 times daily as needed for dizziness  Quantity: 20 tablet  Refills: 0     metoclopramide 5 MG tablet  Commonly known as: Reglan      Dose: 5 mg  Take 1 tablet (5 mg) by mouth 4 times daily as needed (nausea)  Quantity: 10 tablet  Refills: 0     omeprazole 20 MG DR capsule  Commonly known as: PriLOSEC      TAKE ONE " CAPSULE BY MOUTH EVERY DAY 30 MINUTES BEFORE MORNING MEAL  Refills: 0       Past medical history, past surgical history, medications, and allergies were reviewed with the patient. Additional pertinent items: None    No family history on file.    Social History     Tobacco Use    Smoking status: Not on file    Smokeless tobacco: Not on file   Substance Use Topics    Alcohol use: Not on file     Social history was reviewed with the patient. Additional pertinent items: None    Allergies   Allergen Reactions    Amlodipine Other (See Comments)     Reports eye lid swelling and eye itching while taking the medication       Review of Systems  A medically appropriate review of systems was performed with pertinent positives and negatives noted in the HPI, and all other systems negative.    Physical Exam   BP: 123/75  Pulse: 95  Temp: 98.6  F (37  C)  Resp: 16  Weight: 104.3 kg (230 lb)  SpO2: 97 %      Physical Exam  Vitals and nursing note reviewed.   HENT:      Head: Atraumatic.   Eyes:      Extraocular Movements: Extraocular movements intact.      Pupils: Pupils are equal, round, and reactive to light.   Pulmonary:      Breath sounds: Normal breath sounds.   Abdominal:      Palpations: Abdomen is soft.      Tenderness: There is no rebound.      Comments: There is tenderness of the epigastrium to deep palpation   Musculoskeletal:         General: No deformity.   Neurological:      General: No focal deficit present.      Mental Status: She is alert and oriented to person, place, and time.   Psychiatric:         Mood and Affect: Mood normal.         ED Course     Orders Placed This Encounter   Procedures    Abdomen XR, 2 vw, flat and upright    Chest XR,  PA & LAT    Comprehensive metabolic panel    Lipase    CBC with platelets and differential    UA with Microscopic reflex to Culture    EKG 12 lead    Peripheral IV catheter    Catheter Site Care    Admit to Inpatient    CBC with platelets differential          Procedures          EKG revealed a normal sinus rhythm at a rate of 89 with a NJ interval of 0.144 and a QRS duration of 0.072.  The patient had a normal axis with no acute ST or T wave changes significant for ischemia.  This is read by me personally.         Results for orders placed or performed during the hospital encounter of 03/19/25 (from the past 24 hours)   EKG 12 lead   Result Value Ref Range    Systolic Blood Pressure  mmHg    Diastolic Blood Pressure  mmHg    Ventricular Rate 89 BPM    Atrial Rate 89 BPM    NJ Interval 144 ms    QRS Duration 72 ms     ms    QTc 416 ms    P Axis 59 degrees    R AXIS 39 degrees    T Axis 45 degrees    Interpretation ECG Sinus rhythm  Normal ECG      Comprehensive metabolic panel   Result Value Ref Range    Sodium 138 135 - 145 mmol/L    Potassium 3.5 3.4 - 5.3 mmol/L    Carbon Dioxide (CO2) 23 22 - 29 mmol/L    Anion Gap 17 (H) 7 - 15 mmol/L    Urea Nitrogen 19.2 8.0 - 23.0 mg/dL    Creatinine 2.58 (H) 0.51 - 0.95 mg/dL    GFR Estimate 20 (L) >60 mL/min/1.73m2    Calcium 10.1 8.8 - 10.4 mg/dL    Chloride 98 98 - 107 mmol/L    Glucose 146 (H) 70 - 99 mg/dL    Alkaline Phosphatase 58 40 - 150 U/L    AST 26 0 - 45 U/L    ALT 29 0 - 50 U/L    Protein Total 7.4 6.4 - 8.3 g/dL    Albumin 4.1 3.5 - 5.2 g/dL    Bilirubin Total 0.6 <=1.2 mg/dL   Lipase   Result Value Ref Range    Lipase 17 13 - 60 U/L   CBC with platelets differential    Narrative    The following orders were created for panel order CBC with platelets differential.  Procedure                               Abnormality         Status                     ---------                               -----------         ------                     CBC with platelets and ...[[id number]]                      Final result                 Please view results for these tests on the individual orders.   CBC with platelets and differential   Result Value Ref Range    WBC Count 4.9 4.0 - 11.0 10e3/uL    RBC Count 4.18 3.80 - 5.20 10e6/uL     Hemoglobin 12.9 11.7 - 15.7 g/dL    Hematocrit 36.2 35.0 - 47.0 %    MCV 87 78 - 100 fL    MCH 30.9 26.5 - 33.0 pg    MCHC 35.6 31.5 - 36.5 g/dL    RDW 11.9 10.0 - 15.0 %    Platelet Count 249 150 - 450 10e3/uL    % Neutrophils 48 %    % Lymphocytes 35 %    % Monocytes 15 %    % Eosinophils 2 %    % Basophils 0 %    % Immature Granulocytes 0 %    NRBCs per 100 WBC 0 <1 /100    Absolute Neutrophils 2.3 1.6 - 8.3 10e3/uL    Absolute Lymphocytes 1.7 0.8 - 5.3 10e3/uL    Absolute Monocytes 0.7 0.0 - 1.3 10e3/uL    Absolute Eosinophils 0.1 0.0 - 0.7 10e3/uL    Absolute Basophils 0.0 0.0 - 0.2 10e3/uL    Absolute Immature Granulocytes 0.0 <=0.4 10e3/uL    Absolute NRBCs 0.0 10e3/uL       Medications   sodium chloride (PF) 0.9% PF flush 3 mL ( Intracatheter Canceled Entry 3/19/25 1352)   sodium chloride (PF) 0.9% PF flush 3 mL (3 mLs Intracatheter Not Given 3/19/25 1347)   sodium chloride 0.9 % infusion ( Intravenous Not Given 3/19/25 1351)   lactulose (CEPHULAC) Packet 20 g (has no administration in time range)   sucralfate (CARAFATE) tablet 1 g (has no administration in time range)   famotidine (PEPCID) injection 20 mg (has no administration in time range)   sodium chloride 0.9% BOLUS 500 mL (0 mLs Intravenous Stopped 3/19/25 1350)         Critical care was not performed.     Medical Decision Making  The patient's presentation was of moderate complexity (an acute illness with systemic symptoms).    The patient's evaluation involved:  ordering and/or review of 3+ test(s) in this encounter (see above)    The patient's management necessitated high risk (a decision regarding hospitalization).      Assessments & Plan (with Medical Decision Making)   Amber Valencia is a 65 year old [place] female who presents to the emergency department with complaints of upper abdominal discomfort that she has had over the last 10 days.  Patient labs reveal an elevated creatinine and it is definitely higher than baseline from previous 1 year ago.   Patient has been fasting but may also be constipated and/or have gastritis complicating her fasting.  Patient was treated with the medications above and at this time because of her BALJIT will be admitted for IV fluids to the medicine service    I have reviewed the nursing notes.    I have reviewed the findings, diagnosis, plan and need for follow up with the patient.    Final diagnoses:   BALJIT (acute kidney injury)   Acute gastritis without hemorrhage, unspecified gastritis type - with probable constipation, r/o gastroparesis     Adm Med      CURLY VALENCIA MD    3/19/2025   formerly [facility] EMERGENCY DEPARTMENT          Curly Valencia MD  03/19/25 2219

## 2025-03-19 NOTE — PROGRESS NOTES
Changes this shift: Admitted from ED for BALJIT and gastritis. Skin check performed, no skin concerns present. Son with patient.   Neuro: A+Ox4  Cardiac: BP elevated on admit, 140s systolic. Pulse regular.  Respiratory: LS clear on room air  GI/: Endorsing upper abdominal discomfort. Voided in toilet upon admit to room , UA sent.   Diet/appetite: Regular diet  Pain: Upper abd pain, mild  Skin: Intact  LDA's: 1 PIV, infusing NS at 100 ml/hr

## 2025-03-19 NOTE — ED TRIAGE NOTES
Triage Assessment (Adult)       Row Name 03/19/25 1124          Triage Assessment    Airway WDL WDL        Respiratory WDL    Respiratory WDL WDL        Skin Circulation/Temperature WDL    Skin Circulation/Temperature WDL WDL        Cardiac WDL    Cardiac WDL WDL        Peripheral/Neurovascular WDL    Peripheral Neurovascular WDL WDL        Cognitive/Neuro/Behavioral WDL    Cognitive/Neuro/Behavioral WDL WDL

## 2025-03-19 NOTE — MEDICATION SCRIBE - ADMISSION MEDICATION HISTORY
Medication Scribe Admission Medication History    Admission medication history is complete. The information provided in this note is only as accurate as the sources available at the time of the update.    Information Source(s): Patient and Family member via in-person    Pertinent Information:     Patient's son Deepak manages medications for patient. ([phone]) spoke with patient and son at patient's bedside and completed medication history. Patient needs a [facility]  for communication.     Amitriptyline 10 mg daily was last dispensed on 03/06/25 for 90 day supply. However,patient reports not taking this medication because she doesn't want to take anti depressants. Medication not added to PTA per patient.     Changes made to PTA medication list:    Added: Per dispense and pt--Janumet  mg BID                                                          Systane ultra ophthalmic solution                                                           Continuous glucose sensor    Deleted: per patient--Gabapentin 300 mg caps                                            Meclizine 25 mg tabs                                            Reglan 5 mg tab                                        Changed: None    Allergies reviewed with patient and updates made in EHR: yes    Medication History Completed By: Mango Mcleod 3/19/2025 2:42 PM    PTA Med List   Medication Sig Last Dose/Taking    ACETAMINOPHEN EXTRA STRENGTH 500 MG tablet Take 500 mg by mouth every 6 hours as needed Taking As Needed    cholecalciferol 50 MCG (2000 UT) CAPS Take 1 tablet by mouth daily. 3/18/2025 Evening    Continuous Glucose Sensor (FREESTYLE JACQULEINE 2 SENSOR) [facility] TWO TIMES  DAY Unknown    JANUMET  MG tablet Take 1 tablet by mouth 2 times daily (with meals). 3/18/2025 Evening    losartan-hydrochlorothiazide (HYZAAR) 100-12.5 MG tablet Take 1 tablet by mouth daily at 2 pm 3/18/2025 Evening    omeprazole (PRILOSEC) 20 MG DR capsule TAKE ONE  CAPSULE BY MOUTH EVERY DAY 30 MINUTES BEFORE MORNING MEAL 3/18/2025 Evening    SYSTANE ULTRA 0.4-0.3 % SOLN ophthalmic solution 1 drop every 6 hours as needed. Taking As Needed

## 2025-03-19 NOTE — ED NOTES
Asked pt to provide UA, stated currently could not urinate. Encouraged fluids and to let us know when she can urinate. Pt taken for imaging.

## 2025-03-20 VITALS
HEART RATE: 77 BPM | WEIGHT: 230 LBS | TEMPERATURE: 99.2 F | BODY MASS INDEX: 36.02 KG/M2 | DIASTOLIC BLOOD PRESSURE: 73 MMHG | RESPIRATION RATE: 18 BRPM | OXYGEN SATURATION: 97 % | SYSTOLIC BLOOD PRESSURE: 136 MMHG

## 2025-03-20 LAB
ALBUMIN SERPL BCG-MCNC: 3.8 G/DL (ref 3.5–5.2)
ALP SERPL-CCNC: 56 U/L (ref 40–150)
ALT SERPL W P-5'-P-CCNC: 30 U/L (ref 0–50)
ANION GAP SERPL CALCULATED.3IONS-SCNC: 15 MMOL/L (ref 7–15)
AST SERPL W P-5'-P-CCNC: 34 U/L (ref 0–45)
BILIRUB SERPL-MCNC: 0.5 MG/DL
BUN SERPL-MCNC: 16.8 MG/DL (ref 8–23)
CALCIUM SERPL-MCNC: 9.8 MG/DL (ref 8.8–10.4)
CHLORIDE SERPL-SCNC: 104 MMOL/L (ref 98–107)
CREAT SERPL-MCNC: 2 MG/DL (ref 0.51–0.95)
EGFRCR SERPLBLD CKD-EPI 2021: 27 ML/MIN/1.73M2
ERYTHROCYTE [DISTWIDTH] IN BLOOD BY AUTOMATED COUNT: 11.7 % (ref 10–15)
GLUCOSE BLDC GLUCOMTR-MCNC: 120 MG/DL (ref 70–99)
GLUCOSE BLDC GLUCOMTR-MCNC: 133 MG/DL (ref 70–99)
GLUCOSE BLDC GLUCOMTR-MCNC: 139 MG/DL (ref 70–99)
GLUCOSE BLDC GLUCOMTR-MCNC: 142 MG/DL (ref 70–99)
GLUCOSE BLDC GLUCOMTR-MCNC: 147 MG/DL (ref 70–99)
GLUCOSE SERPL-MCNC: 131 MG/DL (ref 70–99)
GLUCOSE SERPL-MCNC: 137 MG/DL (ref 70–99)
HCO3 SERPL-SCNC: 21 MMOL/L (ref 22–29)
HCT VFR BLD AUTO: 35.6 % (ref 35–47)
HGB BLD-MCNC: 12.6 G/DL (ref 11.7–15.7)
MCH RBC QN AUTO: 30.7 PG (ref 26.5–33)
MCHC RBC AUTO-ENTMCNC: 35.4 G/DL (ref 31.5–36.5)
MCV RBC AUTO: 87 FL (ref 78–100)
PLATELET # BLD AUTO: 251 10E3/UL (ref 150–450)
POTASSIUM SERPL-SCNC: 3.6 MMOL/L (ref 3.4–5.3)
PROT SERPL-MCNC: 6.9 G/DL (ref 6.4–8.3)
RBC # BLD AUTO: 4.1 10E6/UL (ref 3.8–5.2)
SODIUM SERPL-SCNC: 140 MMOL/L (ref 135–145)
WBC # BLD AUTO: 4.7 10E3/UL (ref 4–11)

## 2025-03-20 PROCEDURE — 36415 COLL VENOUS BLD VENIPUNCTURE: CPT | Performed by: STUDENT IN AN ORGANIZED HEALTH CARE EDUCATION/TRAINING PROGRAM

## 2025-03-20 PROCEDURE — 82947 ASSAY GLUCOSE BLOOD QUANT: CPT

## 2025-03-20 PROCEDURE — 250N000012 HC RX MED GY IP 250 OP 636 PS 637

## 2025-03-20 PROCEDURE — 250N000011 HC RX IP 250 OP 636

## 2025-03-20 PROCEDURE — 36415 COLL VENOUS BLD VENIPUNCTURE: CPT

## 2025-03-20 PROCEDURE — 250N000011 HC RX IP 250 OP 636: Performed by: FAMILY MEDICINE

## 2025-03-20 PROCEDURE — 258N000003 HC RX IP 258 OP 636: Performed by: STUDENT IN AN ORGANIZED HEALTH CARE EDUCATION/TRAINING PROGRAM

## 2025-03-20 PROCEDURE — 120N000002 HC R&B MED SURG/OB UMMC

## 2025-03-20 PROCEDURE — 99232 SBSQ HOSP IP/OBS MODERATE 35: CPT | Performed by: STUDENT IN AN ORGANIZED HEALTH CARE EDUCATION/TRAINING PROGRAM

## 2025-03-20 PROCEDURE — 250N000013 HC RX MED GY IP 250 OP 250 PS 637: Performed by: INTERNAL MEDICINE

## 2025-03-20 PROCEDURE — 85027 COMPLETE CBC AUTOMATED: CPT

## 2025-03-20 PROCEDURE — 250N000013 HC RX MED GY IP 250 OP 250 PS 637

## 2025-03-20 RX ADMIN — ONDANSETRON 4 MG: 4 TABLET, ORALLY DISINTEGRATING ORAL at 08:39

## 2025-03-20 RX ADMIN — ACETAMINOPHEN 650 MG: 325 TABLET, FILM COATED ORAL at 17:07

## 2025-03-20 RX ADMIN — SUCRALFATE 1 G: 1 TABLET ORAL at 13:11

## 2025-03-20 RX ADMIN — FAMOTIDINE 20 MG: 10 INJECTION, SOLUTION INTRAVENOUS at 13:12

## 2025-03-20 RX ADMIN — ACETAMINOPHEN 650 MG: 325 TABLET, FILM COATED ORAL at 08:39

## 2025-03-20 RX ADMIN — LACTULOSE 20 G: 20 POWDER, FOR SOLUTION ORAL at 08:42

## 2025-03-20 RX ADMIN — MICONAZOLE NITRATE: 20 CREAM TOPICAL at 21:29

## 2025-03-20 RX ADMIN — SODIUM CHLORIDE, SODIUM LACTATE, POTASSIUM CHLORIDE, AND CALCIUM CHLORIDE 1000 ML: .6; .31; .03; .02 INJECTION, SOLUTION INTRAVENOUS at 08:43

## 2025-03-20 RX ADMIN — MICONAZOLE NITRATE: 20 CREAM TOPICAL at 08:43

## 2025-03-20 RX ADMIN — ONDANSETRON 4 MG: 2 INJECTION INTRAMUSCULAR; INTRAVENOUS at 14:48

## 2025-03-20 RX ADMIN — SUCRALFATE 1 G: 1 TABLET ORAL at 06:45

## 2025-03-20 RX ADMIN — SUCRALFATE 1 G: 1 TABLET ORAL at 21:19

## 2025-03-20 RX ADMIN — SUCRALFATE 1 G: 1 TABLET ORAL at 17:00

## 2025-03-20 RX ADMIN — PANTOPRAZOLE SODIUM 20 MG: 20 TABLET, DELAYED RELEASE ORAL at 06:45

## 2025-03-20 RX ADMIN — LACTULOSE 20 G: 20 POWDER, FOR SOLUTION ORAL at 21:20

## 2025-03-20 RX ADMIN — INSULIN ASPART 1 UNITS: 100 INJECTION, SOLUTION INTRAVENOUS; SUBCUTANEOUS at 00:34

## 2025-03-20 ASSESSMENT — ACTIVITIES OF DAILY LIVING (ADL)
ADLS_ACUITY_SCORE: 45
ADLS_ACUITY_SCORE: 46
ADLS_ACUITY_SCORE: 41
ADLS_ACUITY_SCORE: 41
ADLS_ACUITY_SCORE: 46
ADLS_ACUITY_SCORE: 46
ADLS_ACUITY_SCORE: 41
ADLS_ACUITY_SCORE: 45
ADLS_ACUITY_SCORE: 41
ADLS_ACUITY_SCORE: 41
ADLS_ACUITY_SCORE: 46
ADLS_ACUITY_SCORE: 46
ADLS_ACUITY_SCORE: 45
ADLS_ACUITY_SCORE: 41
ADLS_ACUITY_SCORE: 41
ADLS_ACUITY_SCORE: 45
ADLS_ACUITY_SCORE: 45

## 2025-03-20 NOTE — PROGRESS NOTES
[facility]    Medicine Progress Note - Hospitalist Service, GOLD TEAM 19    Date of Admission:  3/19/2025    Assessment & Plan      Amber Valencia is a 65 year old female admitted on 3/19/2025.  Medical history notable for hypertension, polyarthralgia, vitamin D deficiency.  Presented to emergency department with upper abdominal pain with nausea and vomiting.     #Acute gastritis, improved  #Nausea/vomiting, resolved  Reports 1-2 days nausea/vomiting. No sick contacts. Cannot Identity food trigger. Denies hematemesis. Has not had BM for 3-4 days, however, not obstructed on abdominal xray. Suspected gastritis. Improved today. Tolerating PO intake.   - IVFs as below  - encourage PO intake as tolerated  - prn antiemetics    #BALJIT, improving  Last creatinine 0.8 on 2/19/2024.  Upon admission 3/19 creatinine found to be 2.58.  Reports poor p.o. intake with recent nausea/vomiting. Cr 2.58 --> 2.0 today. No dysuria, urgency, frequency.  - additional 1 L LR today  - Hold PTA losartan hydrochlorothiazide    #Suspected tinea pedis  #Ulceration between fourth/fifth digits of right foot  Noted to have suspected fungal infection between fourth/fifth digits right foot.  Skin involvement rather moderate, may consider WOC consult if worsens.  -Miconazole cream BID     #Hypertension  Current blood pressure 123/75  -Hold prior to arrival losartan-hydrochlorothiazide in setting of BALJIT    #Diabetes mellitus type 2  Review appears last A1c 11.3 on 2/19/2024  -Sliding scale insulin  -Point-of-care glucose monitoring  -Hold PTA janumet  -Follow-up with PCP for further management of diabetes    #GERD  -PTA Protonix    #Polyarthralgia  -Reports no longer taking PTA gabapentin  -As needed acetaminophen       Diet: Regular  DVT Prophylaxis: Pneumatic Compression Devices  [place] Catheter: Not present  Lines: None     Cardiac Monitoring: None  Code Status: Full          Diet: Combination Diet Regular  Diet Adult    DVT Prophylaxis: Pneumatic Compression Devices  [place] Catheter: Not present  Lines: None     Cardiac Monitoring: None  Code Status: Full Code      Clinically Significant Risk Factors Present on Admission                   # Hypertension: Home medication list includes antihypertensive(s)          # DMII: A1C = 7.0 % (Ref range: <5.7 %) within past 6 months               Social Drivers of Health    Food Insecurity: Not on File (2024)    Received from [employer]    Food Insecurity     Food: 0   Housing Stability: Not on File (2019)    Received from [employer]    Housing Stability     Housin   Financial Resource Strain: Not on File (2019)    Received from [employer]    Financial Resource Strain     Financial Resource Strain: 0   Transportation Needs: Not on File (2019)    Received from [employer]    Transportation Needs     Transportation: 0   Physical Activity: Not on File (2019)    Received from [employer]    Physical Activity     Physical Activity: 0   Stress: Not on File (2019)    Received from [employer]    Stress     Stress: 0   Social Connections: Not on File (2024)    Received from [employer]    Social Connections     Connectedness: 0          Disposition Plan     Medically Ready for Discharge: Anticipated Tomorrow             Adrián Cole MD  Hospitalist Service, GOLD TEAM 19  [facility]  Securely message with [employer] (more info)  Text page via [employer] Paging/Directory   See signed in provider for up to date coverage information  ______________________________________________________________________    Interval History   NOEs  Improved today  Ongoing epigastric pain, mild    Physical Exam   Vital Signs: Temp: 99  F (37.2  C) Temp src: Oral BP: (!) 145/69 Pulse: 88   Resp: 16 SpO2: 97 % O2 Device: None (Room air)    Weight: 230 lbs 0 oz    General Appearance: Well appearing, in bed, son bedside  Respiratory: CTAB,  no w/r/r, no inc WOB  Cardiovascular: RRR, no m/r/g   GI: mild epigastric tenderness, no distension, soft  MSK: no flank pain    Medical Decision Making             Data

## 2025-03-20 NOTE — PLAN OF CARE
Goal Outcome Evaluation:      Plan of Care Reviewed With: patient    Overall Patient Progress: improvingOverall Patient Progress: improving           VS: /61 (BP Location: Right arm)   Pulse 83   Temp 98.8  F (37.1  C) (Oral)   Resp 16   Wt 104.3 kg (230 lb)   SpO2 93%   BMI 36.02 kg/m     O2: Stable on RA. Denies SOB and chest pain.   Output: Voids adequately without difficulty    Last BM: 3/16 per pt, passing gas, gotten lactulose this shift   Activity: Independent in rm   Skin: Intact   Pain: Abdominal discomfort, managed with PRN tylenol   CMS: A&O x 4   Dressing: None   Diet: Regular diet   LDA: L PIV infusing LR bolus @ 100 ml/hr   Equipment: IV pole, personal belongings   Plan: Anticipate discharge tomorrow   Additional Info: Violet speaker. Son in room helping with interpretation.

## 2025-03-20 NOTE — PLAN OF CARE
Goal Outcome Evaluation:      Plan of Care Reviewed With: patient    Overall Patient Progress: improvingOverall Patient Progress: improving    Outcome Evaluation: Pt remains alert and oriented x 4 and able to make her needs known through interperator from her son who is at the bedside, pt had a fever of 100.3, Tylenol administered for it and also for pain rated at 5/10 with effect, Provider updated via text, pt is independent in room, regualr diet and takes medicationbs whole with water, will continue plan of care

## 2025-03-20 NOTE — PLAN OF CARE
Pt is A/O x4, came in with abdominal pain with nausea and vomiting. Upon admission,creatinine was 2.58. Pt denies pain, states she feels much better. Family at bedside. Pt is continent of B/B, ind in room, L PIV SL. BG check reported of not having BM for the past three days. Pt got lactulose this shift.

## 2025-03-21 VITALS
DIASTOLIC BLOOD PRESSURE: 76 MMHG | SYSTOLIC BLOOD PRESSURE: 144 MMHG | HEART RATE: 83 BPM | OXYGEN SATURATION: 97 % | RESPIRATION RATE: 17 BRPM | WEIGHT: 230 LBS | TEMPERATURE: 99.9 F | BODY MASS INDEX: 36.02 KG/M2

## 2025-03-21 LAB
ALBUMIN SERPL BCG-MCNC: 3.8 G/DL (ref 3.5–5.2)
ANION GAP SERPL CALCULATED.3IONS-SCNC: 15 MMOL/L (ref 7–15)
BUN SERPL-MCNC: 11.4 MG/DL (ref 8–23)
CALCIUM SERPL-MCNC: 10.1 MG/DL (ref 8.8–10.4)
CHLORIDE SERPL-SCNC: 101 MMOL/L (ref 98–107)
CREAT SERPL-MCNC: 1.39 MG/DL (ref 0.51–0.95)
EGFRCR SERPLBLD CKD-EPI 2021: 42 ML/MIN/1.73M2
GLUCOSE BLDC GLUCOMTR-MCNC: 107 MG/DL (ref 70–99)
GLUCOSE BLDC GLUCOMTR-MCNC: 136 MG/DL (ref 70–99)
GLUCOSE BLDC GLUCOMTR-MCNC: 150 MG/DL (ref 70–99)
GLUCOSE SERPL-MCNC: 138 MG/DL (ref 70–99)
HCO3 SERPL-SCNC: 22 MMOL/L (ref 22–29)
HOLD SPECIMEN: NORMAL
PHOSPHATE SERPL-MCNC: 3.8 MG/DL (ref 2.5–4.5)
POTASSIUM SERPL-SCNC: 3.3 MMOL/L (ref 3.4–5.3)
SODIUM SERPL-SCNC: 138 MMOL/L (ref 135–145)

## 2025-03-21 PROCEDURE — 250N000013 HC RX MED GY IP 250 OP 250 PS 637: Performed by: INTERNAL MEDICINE

## 2025-03-21 PROCEDURE — 36415 COLL VENOUS BLD VENIPUNCTURE: CPT | Performed by: STUDENT IN AN ORGANIZED HEALTH CARE EDUCATION/TRAINING PROGRAM

## 2025-03-21 PROCEDURE — 80069 RENAL FUNCTION PANEL: CPT | Performed by: STUDENT IN AN ORGANIZED HEALTH CARE EDUCATION/TRAINING PROGRAM

## 2025-03-21 PROCEDURE — 250N000013 HC RX MED GY IP 250 OP 250 PS 637

## 2025-03-21 PROCEDURE — 99239 HOSP IP/OBS DSCHRG MGMT >30: CPT | Performed by: STUDENT IN AN ORGANIZED HEALTH CARE EDUCATION/TRAINING PROGRAM

## 2025-03-21 PROCEDURE — 250N000013 HC RX MED GY IP 250 OP 250 PS 637: Performed by: STUDENT IN AN ORGANIZED HEALTH CARE EDUCATION/TRAINING PROGRAM

## 2025-03-21 RX ORDER — POTASSIUM CHLORIDE 1500 MG/1
20 TABLET, EXTENDED RELEASE ORAL ONCE
Status: COMPLETED | OUTPATIENT
Start: 2025-03-21 | End: 2025-03-21

## 2025-03-21 RX ORDER — ONDANSETRON 4 MG/1
4 TABLET, ORALLY DISINTEGRATING ORAL EVERY 6 HOURS PRN
Qty: 30 TABLET | Refills: 0 | Status: SHIPPED | OUTPATIENT
Start: 2025-03-21

## 2025-03-21 RX ADMIN — LACTULOSE 20 G: 20 POWDER, FOR SOLUTION ORAL at 07:37

## 2025-03-21 RX ADMIN — INSULIN ASPART 1 UNITS: 100 INJECTION, SOLUTION INTRAVENOUS; SUBCUTANEOUS at 01:02

## 2025-03-21 RX ADMIN — MICONAZOLE NITRATE: 20 CREAM TOPICAL at 07:43

## 2025-03-21 RX ADMIN — SUCRALFATE 1 G: 1 TABLET ORAL at 06:56

## 2025-03-21 RX ADMIN — POTASSIUM CHLORIDE 20 MEQ: 1500 TABLET, EXTENDED RELEASE ORAL at 09:19

## 2025-03-21 RX ADMIN — PANTOPRAZOLE SODIUM 20 MG: 20 TABLET, DELAYED RELEASE ORAL at 06:56

## 2025-03-21 ASSESSMENT — ACTIVITIES OF DAILY LIVING (ADL)
ADLS_ACUITY_SCORE: 46

## 2025-03-21 NOTE — DISCHARGE SUMMARY
[facility]  Hospitalist Discharge Summary      Date of Admission:  3/19/2025  Date of Discharge:  3/21/2025  Discharging Provider: Adrián Cole MD  Discharge Service: Hospitalist Service, GOLD TEAM 19    Discharge Diagnoses   #Acute gastritis, improved  #Nausea/vomiting, resolved  #BALJIT, improving  #Suspected tinea pedis  #Ulceration between fourth/fifth digits of right foot  #Hypertension  #Diabetes mellitus type 2  #GERD  #Polyarthralgia    Clinically Significant Risk Factors     # DMII: A1C = 7.0 % (Ref range: <5.7 %) within past 6 months       Follow-ups Needed After Discharge   repeat bmp    Unresulted Labs Ordered in the Past 30 Days of this Admission       No orders found from 2/17/2025 to 3/20/2025.        These results will be followed up by PCP    Discharge Disposition   Discharged to home  Condition at discharge: Stable    Hospital Course   Amber Valencia is a 65 year old female admitted on 3/19/2025.  Medical history notable for hypertension, polyarthralgia, vitamin D deficiency.  Presented to emergency department with upper abdominal pain with nausea and vomiting. BALJIT improving on d/c. Will hold hyzaar until f/u with PCP with repeat labs to ensure resolution of BALJIT before restarting. BP on d/c off hyzaar SBP ~140.      #Acute gastritis, improved  #Nausea/vomiting, resolved  Reports 1-2 days nausea/vomiting. No sick contacts. Cannot Identity food trigger. Denies hematemesis. Has not had BM for 3-4 days, however, not obstructed on abdominal xray. Suspected gastritis. Improved today. Tolerating PO intake.   - antiemetics prn as outpatient    #BALJIT, improving  Last creatinine 0.8 on 2/19/2024.  Upon admission 3/19 creatinine found to be 2.58.  Reports poor p.o. intake with recent nausea/vomiting. Cr 2.58 --> 2.0 today. No dysuria, urgency, frequency.  - encourage PO hydration  - Hold PTA losartan hydrochlorothiazide on discharge, recommend f/u within 1 week with  repeat BMP     #Suspected tinea pedis  #Ulceration between fourth/fifth digits of right foot  Noted to have suspected fungal infection between fourth/fifth digits right foot.  Skin involvement rather moderate, may consider WOC consult if worsens.  -Miconazole cream BID      #Hypertension  Current blood pressure 123/75  - hold hyzaar as above     #Diabetes mellitus type 2  Review appears last A1c 11.3 on 2/19/2024  -Sliding scale insulin  -Point-of-care glucose monitoring  -resume PTA janumet as outpatient  -Follow-up with PCP for further management of diabetes     #GERD  -PTA Protonix     #Polyarthralgia  -Reports no longer taking PTA gabapentin  -As needed acetaminophen       Consultations This Hospital Stay   None    Code Status   Full Code    Time Spent on this Encounter   I, Adrián Cole MD, personally saw the patient today and spent greater than 30 minutes discharging this patient.       Adrián Cole MD  [facility] MED SURG ORTHOPEDIC  [address] [address]  Phone: [phone]  Fax: [phone]  ______________________________________________________________________    Physical Exam   Vital Signs: Temp: 99.4  F (37.4  C) Temp src: Oral BP: 133/68 Pulse: 83   Resp: 17 SpO2: 99 % O2 Device: None (Room air)    Weight: 230 lbs 0 oz    General Appearance:  Well appearing, in bed, son bedside  Respiratory: CTAB, no w/r/r, no inc WOB  Cardiovascular: RRR, no m/r/g   GI: nontender, no distension, soft  MSK: no flank pain    Primary Care Physician   Physician No Ref-Primary    Discharge Orders   No discharge procedures on file.    Significant Results and Procedures   Most Recent 3 CBC's:  Recent Labs   Lab Test 03/20/25  0642 03/19/25  1157 02/19/24  1743   WBC 4.7 4.9 6.0   HGB 12.6 12.9 14.8   MCV 87 87 89    249 247     Most Recent 3 BMP's:  Recent Labs   Lab Test 03/21/25  0622 03/21/25  0405 03/21/25  0008 03/20/25  0820 03/20/25  0642 03/19/25  1629  03/19/25  1157     --   --   --  140  --  138   POTASSIUM 3.3*  --   --   --  3.6  --  3.5   CHLORIDE 101  --   --   --  104  --  98   CO2 22  --   --   --  21*  --  23   BUN 11.4  --   --   --  16.8  --  19.2   CR 1.39*  --   --   --  2.00*  --  2.58*   ANIONGAP 15  --   --   --  15  --  17*   VALDEZ 10.1  --   --   --  9.8  --  10.1   * 107* 150*   < > 137*   < > 146*    < > = values in this interval not displayed.     Most Recent 2 LFT's:  Recent Labs   Lab Test 03/20/25  0642 03/19/25  1157   AST 34 26   ALT 30 29   ALKPHOS 56 58   BILITOTAL 0.5 0.6       Discharge Medications   Current Discharge Medication List        CONTINUE these medications which have NOT CHANGED    Details   ACETAMINOPHEN EXTRA STRENGTH 500 MG tablet Take 500 mg by mouth every 6 hours as needed      cholecalciferol 50 MCG (2000 UT) CAPS Take 1 tablet by mouth daily.      Continuous Glucose Sensor (FREESTYLE JACQUELINE 2 SENSOR) MIS TWO TIMES  DAY      JANUMET  MG tablet Take 1 tablet by mouth 2 times daily (with meals).      losartan-hydrochlorothiazide (HYZAAR) 100-12.5 MG tablet Take 1 tablet by mouth daily at 2 pm      omeprazole (PRILOSEC) 20 MG DR capsule TAKE ONE CAPSULE BY MOUTH EVERY DAY 30 MINUTES BEFORE MORNING MEAL      SYSTANE ULTRA 0.4-0.3 % SOLN ophthalmic solution 1 drop every 6 hours as needed.           Allergies   Allergies   Allergen Reactions    Amlodipine Other (See Comments)     Reports eye lid swelling and eye itching while taking the medication

## 2025-03-21 NOTE — PROGRESS NOTES
VS: BP (!) 144/76 (BP Location: Right arm)   Pulse 83   Temp 99.9  F (37.7  C) (Oral)   Resp 17   Wt 104.3 kg (230 lb)   SpO2 97%   BMI 36.02 kg/m      O2: Pt room air saturations 97%.    Output: Denies issues with urine output   Last BM: 3/16/2025. Pt was given lactulose this am. Pt has positive bowel sounds. Pt states that she has been fasting for [place] for her culture.    Activity: Up ad david   Skin: Has a fungus between toes on both feet. Topical has been applied to both sides via patient. She seems like topical is helping with comfort   Pain: No. Only intermittent nausea. Pt will be sent home with Zofran   CMS: Intact   Dressing: NA   Diet: Pt was able to eat yogurt and drink her own bottled water that her son brought from home.   LDA: Will make sure patient does not have an IV before she goes home   Equipment:    Plan: Plan is for patient to be discharged back to home. Her son will transport her via car   Additional Info: Pt had meds filled here at the hospital. All other belongings were returned to patient before discharge.

## 2025-03-21 NOTE — PLAN OF CARE
Goal Outcome Evaluation:      Overall Patient Progress: improving      Pt A & O X 4 but Irish speaking only. Son in room and helps with translation. . Up independently in room to the BR. Denies pain, N/V at this time. Got a dose of Lactulose but has not had a BM at this time. Reports was fasting but now eating some food brought in by family. Pleasant/cooperative/able to make needs known. Nursing is monitoring and assisting as needed      Nadia Nowak RN

## 2025-06-02 LAB
ATRIAL RATE - MUSE: 89 BPM
DIASTOLIC BLOOD PRESSURE - MUSE: NORMAL MMHG
INTERPRETATION ECG - MUSE: NORMAL
P AXIS - MUSE: 59 DEGREES
PR INTERVAL - MUSE: 144 MS
QRS DURATION - MUSE: 72 MS
QT - MUSE: 342 MS
QTC - MUSE: 416 MS
R AXIS - MUSE: 39 DEGREES
SYSTOLIC BLOOD PRESSURE - MUSE: NORMAL MMHG
T AXIS - MUSE: 45 DEGREES
VENTRICULAR RATE- MUSE: 89 BPM